# Patient Record
Sex: FEMALE | Race: WHITE | NOT HISPANIC OR LATINO | Employment: OTHER | URBAN - METROPOLITAN AREA
[De-identification: names, ages, dates, MRNs, and addresses within clinical notes are randomized per-mention and may not be internally consistent; named-entity substitution may affect disease eponyms.]

---

## 2023-04-18 DIAGNOSIS — R42 VERTIGO: Primary | ICD-10-CM

## 2023-04-18 DIAGNOSIS — F32.A DEPRESSION, UNSPECIFIED DEPRESSION TYPE: ICD-10-CM

## 2023-04-18 RX ORDER — MECLIZINE HYDROCHLORIDE 25 MG/1
25 TABLET ORAL NIGHTLY PRN
Qty: 30 TABLET | Refills: 2 | Status: SHIPPED | OUTPATIENT
Start: 2023-04-18 | End: 2024-03-15 | Stop reason: WASHOUT

## 2023-04-18 RX ORDER — MELOXICAM 15 MG/1
1 TABLET ORAL DAILY PRN
COMMUNITY
Start: 2019-11-12 | End: 2024-01-09 | Stop reason: SDUPTHER

## 2023-04-18 RX ORDER — CYCLOBENZAPRINE HCL 10 MG
1 TABLET ORAL 3 TIMES DAILY PRN
COMMUNITY
Start: 2021-11-17

## 2023-04-18 RX ORDER — SERTRALINE HYDROCHLORIDE 50 MG/1
1 TABLET, FILM COATED ORAL DAILY
COMMUNITY
Start: 2022-04-25 | End: 2024-03-15

## 2023-04-18 RX ORDER — LOSARTAN POTASSIUM 50 MG/1
1 TABLET ORAL DAILY
COMMUNITY
Start: 2022-04-14 | End: 2023-07-05 | Stop reason: SDUPTHER

## 2023-04-18 RX ORDER — PSYLLIUM HUSK 0.4 G
CAPSULE ORAL
COMMUNITY

## 2023-04-18 RX ORDER — SERTRALINE HYDROCHLORIDE 100 MG/1
100 TABLET, FILM COATED ORAL DAILY
Qty: 90 TABLET | Refills: 3 | Status: SHIPPED | OUTPATIENT
Start: 2023-04-18 | End: 2024-04-09 | Stop reason: SDUPTHER

## 2023-04-18 RX ORDER — SERTRALINE HYDROCHLORIDE 100 MG/1
1 TABLET, FILM COATED ORAL DAILY
COMMUNITY
Start: 2016-07-01 | End: 2023-04-18 | Stop reason: SDUPTHER

## 2023-04-18 RX ORDER — ALPRAZOLAM 0.5 MG/1
1 TABLET ORAL DAILY PRN
COMMUNITY
Start: 2017-08-09 | End: 2024-03-15 | Stop reason: SDUPTHER

## 2023-04-18 RX ORDER — LEVOTHYROXINE SODIUM 125 UG/1
1 TABLET ORAL DAILY
COMMUNITY
Start: 2016-04-07 | End: 2023-05-23 | Stop reason: SDUPTHER

## 2023-04-18 RX ORDER — PROPRANOLOL/HYDROCHLOROTHIAZID 40 MG-25MG
TABLET ORAL
COMMUNITY

## 2023-04-18 RX ORDER — ACETAMINOPHEN 500 MG
2 TABLET ORAL DAILY
COMMUNITY

## 2023-04-18 RX ORDER — MECLIZINE HYDROCHLORIDE 25 MG/1
1 TABLET ORAL NIGHTLY PRN
COMMUNITY
Start: 2017-08-09 | End: 2023-04-18 | Stop reason: SDUPTHER

## 2023-05-23 DIAGNOSIS — E03.9 HYPOTHYROIDISM, UNSPECIFIED TYPE: Primary | ICD-10-CM

## 2023-05-23 RX ORDER — LEVOTHYROXINE SODIUM 125 UG/1
125 TABLET ORAL DAILY
Qty: 90 TABLET | Refills: 3 | Status: SHIPPED | OUTPATIENT
Start: 2023-05-23

## 2023-07-05 DIAGNOSIS — I10 BENIGN ESSENTIAL HYPERTENSION: Primary | ICD-10-CM

## 2023-07-05 RX ORDER — LOSARTAN POTASSIUM 50 MG/1
50 TABLET ORAL DAILY
Qty: 90 TABLET | Refills: 1 | Status: SHIPPED | OUTPATIENT
Start: 2023-07-05 | End: 2023-07-06 | Stop reason: SDUPTHER

## 2023-07-06 DIAGNOSIS — I10 BENIGN ESSENTIAL HYPERTENSION: ICD-10-CM

## 2023-07-06 RX ORDER — LOSARTAN POTASSIUM 50 MG/1
50 TABLET ORAL DAILY
Qty: 90 TABLET | Refills: 3 | Status: SHIPPED | OUTPATIENT
Start: 2023-07-06

## 2023-08-24 ENCOUNTER — TELEPHONE (OUTPATIENT)
Dept: PRIMARY CARE | Facility: CLINIC | Age: 63
End: 2023-08-24
Payer: COMMERCIAL

## 2023-08-24 NOTE — TELEPHONE ENCOUNTER
Pt called and states that she has been treated for 2 UTI so far this year and is wondering if you could prescribe her Bactrim due to having another one. Pt symptoms are fever, urgency to urinate, cloudy,and a slight odor. Pharmacy is on file and no allergies to any medications

## 2023-12-07 ENCOUNTER — TELEPHONE (OUTPATIENT)
Dept: PRIMARY CARE | Facility: CLINIC | Age: 63
End: 2023-12-07
Payer: COMMERCIAL

## 2023-12-07 ENCOUNTER — LAB (OUTPATIENT)
Dept: LAB | Facility: LAB | Age: 63
End: 2023-12-07
Payer: COMMERCIAL

## 2023-12-07 DIAGNOSIS — N39.0 URINARY TRACT INFECTION WITHOUT HEMATURIA, SITE UNSPECIFIED: Primary | ICD-10-CM

## 2023-12-07 DIAGNOSIS — N39.0 URINARY TRACT INFECTION WITHOUT HEMATURIA, SITE UNSPECIFIED: ICD-10-CM

## 2023-12-07 LAB
BACTERIA #/AREA URNS AUTO: ABNORMAL /HPF
HYALINE CASTS #/AREA URNS AUTO: ABNORMAL /LPF
MUCOUS THREADS #/AREA URNS AUTO: ABNORMAL /LPF
RBC #/AREA URNS AUTO: >20 /HPF
WBC #/AREA URNS AUTO: >50 /HPF

## 2023-12-07 PROCEDURE — 81001 URINALYSIS AUTO W/SCOPE: CPT

## 2023-12-07 PROCEDURE — 87186 SC STD MICRODIL/AGAR DIL: CPT

## 2023-12-07 PROCEDURE — 87086 URINE CULTURE/COLONY COUNT: CPT

## 2023-12-07 NOTE — TELEPHONE ENCOUNTER
Pt called and states that she is still having the urgency to use the restroom. Pt states that you have been treating her for UTI and thinks that they are chronic now due to having urgency's and the color of her urine. Pt would like to know if she could stop in the office and give a urine sample.

## 2023-12-08 DIAGNOSIS — N39.0 URINARY TRACT INFECTION WITHOUT HEMATURIA, SITE UNSPECIFIED: Primary | ICD-10-CM

## 2023-12-08 RX ORDER — CIPROFLOXACIN 500 MG/1
500 TABLET ORAL 2 TIMES DAILY
Qty: 20 TABLET | Refills: 0 | Status: SHIPPED | OUTPATIENT
Start: 2023-12-08 | End: 2023-12-18

## 2023-12-10 LAB — BACTERIA UR CULT: ABNORMAL

## 2024-01-09 DIAGNOSIS — M19.91 PRIMARY OSTEOARTHRITIS, UNSPECIFIED SITE: Primary | ICD-10-CM

## 2024-01-09 RX ORDER — MELOXICAM 15 MG/1
15 TABLET ORAL DAILY PRN
Qty: 90 TABLET | Refills: 3 | Status: SHIPPED | OUTPATIENT
Start: 2024-01-09

## 2024-01-30 ENCOUNTER — APPOINTMENT (OUTPATIENT)
Dept: PRIMARY CARE | Facility: CLINIC | Age: 64
End: 2024-01-30
Payer: COMMERCIAL

## 2024-03-14 ASSESSMENT — PROMIS GLOBAL HEALTH SCALE
RATE_PHYSICAL_HEALTH: GOOD
RATE_GENERAL_HEALTH: GOOD
RATE_QUALITY_OF_LIFE: GOOD
CARRYOUT_PHYSICAL_ACTIVITIES: COMPLETELY
EMOTIONAL_PROBLEMS: RARELY
RATE_MENTAL_HEALTH: GOOD
CARRYOUT_SOCIAL_ACTIVITIES: VERY GOOD
RATE_AVERAGE_FATIGUE: MILD
RATE_AVERAGE_PAIN: 2
RATE_SOCIAL_SATISFACTION: GOOD

## 2024-03-15 ENCOUNTER — OFFICE VISIT (OUTPATIENT)
Dept: PRIMARY CARE | Facility: CLINIC | Age: 64
End: 2024-03-15
Payer: COMMERCIAL

## 2024-03-15 VITALS
HEIGHT: 62 IN | DIASTOLIC BLOOD PRESSURE: 76 MMHG | TEMPERATURE: 97.7 F | BODY MASS INDEX: 36.95 KG/M2 | WEIGHT: 200.8 LBS | OXYGEN SATURATION: 99 % | HEART RATE: 88 BPM | SYSTOLIC BLOOD PRESSURE: 120 MMHG

## 2024-03-15 DIAGNOSIS — I10 BENIGN ESSENTIAL HYPERTENSION: ICD-10-CM

## 2024-03-15 DIAGNOSIS — Z12.31 SCREENING MAMMOGRAM FOR BREAST CANCER: ICD-10-CM

## 2024-03-15 DIAGNOSIS — Z00.00 WELL ADULT EXAM: Primary | ICD-10-CM

## 2024-03-15 DIAGNOSIS — F41.9 ANXIETY: ICD-10-CM

## 2024-03-15 DIAGNOSIS — M15.9 GENERALIZED OSTEOARTHRITIS OF MULTIPLE SITES: ICD-10-CM

## 2024-03-15 DIAGNOSIS — E03.9 HYPOTHYROIDISM, UNSPECIFIED TYPE: ICD-10-CM

## 2024-03-15 PROBLEM — L30.9 ECZEMA: Status: ACTIVE | Noted: 2024-03-15

## 2024-03-15 PROBLEM — J02.9 ACUTE PHARYNGITIS: Status: ACTIVE | Noted: 2024-03-15

## 2024-03-15 PROBLEM — M85.80 OSTEOPENIA: Status: ACTIVE | Noted: 2024-03-15

## 2024-03-15 PROBLEM — S83.002A: Status: ACTIVE | Noted: 2024-03-15

## 2024-03-15 PROBLEM — M25.561 KNEE PAIN, RIGHT: Status: ACTIVE | Noted: 2024-03-15

## 2024-03-15 PROBLEM — H81.10 BENIGN POSITIONAL VERTIGO: Status: ACTIVE | Noted: 2024-03-15

## 2024-03-15 PROBLEM — F41.8 OTHER SPECIFIED ANXIETY DISORDERS: Status: ACTIVE | Noted: 2024-03-15

## 2024-03-15 PROBLEM — G47.33 OSA (OBSTRUCTIVE SLEEP APNEA): Status: ACTIVE | Noted: 2024-03-15

## 2024-03-15 PROBLEM — M79.671 PAIN IN RIGHT FOOT: Status: ACTIVE | Noted: 2017-01-12

## 2024-03-15 PROBLEM — N39.0 ACUTE UTI: Status: ACTIVE | Noted: 2024-03-15

## 2024-03-15 PROBLEM — M25.572 PAIN IN JOINT, FOOT, LEFT: Status: ACTIVE | Noted: 2017-01-12

## 2024-03-15 PROBLEM — R50.9 CHILLS WITH FEVER: Status: ACTIVE | Noted: 2024-03-15

## 2024-03-15 PROBLEM — M25.571 PAIN IN JOINT OF RIGHT FOOT: Status: ACTIVE | Noted: 2017-01-12

## 2024-03-15 PROBLEM — R92.8 ABNORMAL MAMMOGRAM: Status: ACTIVE | Noted: 2024-03-15

## 2024-03-15 PROBLEM — M17.11 PATELLOFEMORAL ARTHRITIS OF RIGHT KNEE: Status: ACTIVE | Noted: 2024-03-15

## 2024-03-15 PROBLEM — S39.012A LUMBAR STRAIN: Status: ACTIVE | Noted: 2024-03-15

## 2024-03-15 LAB
25(OH)D3 SERPL-MCNC: 76 NG/ML (ref 30–100)
ALBUMIN SERPL BCP-MCNC: 4.6 G/DL (ref 3.4–5)
ALP SERPL-CCNC: 70 U/L (ref 33–136)
ALT SERPL W P-5'-P-CCNC: 25 U/L (ref 7–45)
ANION GAP SERPL CALC-SCNC: 15 MMOL/L (ref 10–20)
AST SERPL W P-5'-P-CCNC: 22 U/L (ref 9–39)
BASOPHILS # BLD AUTO: 0.04 X10*3/UL (ref 0–0.1)
BASOPHILS NFR BLD AUTO: 0.6 %
BILIRUB SERPL-MCNC: 0.6 MG/DL (ref 0–1.2)
BUN SERPL-MCNC: 15 MG/DL (ref 6–23)
CALCIUM SERPL-MCNC: 10.1 MG/DL (ref 8.6–10.6)
CHLORIDE SERPL-SCNC: 103 MMOL/L (ref 98–107)
CHOLEST SERPL-MCNC: 248 MG/DL (ref 0–199)
CHOLESTEROL/HDL RATIO: 5.2
CO2 SERPL-SCNC: 25 MMOL/L (ref 21–32)
CREAT SERPL-MCNC: 0.68 MG/DL (ref 0.5–1.05)
EGFRCR SERPLBLD CKD-EPI 2021: >90 ML/MIN/1.73M*2
EOSINOPHIL # BLD AUTO: 0.2 X10*3/UL (ref 0–0.7)
EOSINOPHIL NFR BLD AUTO: 3.2 %
ERYTHROCYTE [DISTWIDTH] IN BLOOD BY AUTOMATED COUNT: 13.2 % (ref 11.5–14.5)
GLUCOSE SERPL-MCNC: 99 MG/DL (ref 74–99)
HCT VFR BLD AUTO: 46.8 % (ref 36–46)
HDLC SERPL-MCNC: 47.5 MG/DL
HGB BLD-MCNC: 14.8 G/DL (ref 12–16)
IMM GRANULOCYTES # BLD AUTO: 0.02 X10*3/UL (ref 0–0.7)
IMM GRANULOCYTES NFR BLD AUTO: 0.3 % (ref 0–0.9)
LDLC SERPL CALC-MCNC: 169 MG/DL
LYMPHOCYTES # BLD AUTO: 1.02 X10*3/UL (ref 1.2–4.8)
LYMPHOCYTES NFR BLD AUTO: 16.6 %
MCH RBC QN AUTO: 28.2 PG (ref 26–34)
MCHC RBC AUTO-ENTMCNC: 31.6 G/DL (ref 32–36)
MCV RBC AUTO: 89 FL (ref 80–100)
MONOCYTES # BLD AUTO: 0.34 X10*3/UL (ref 0.1–1)
MONOCYTES NFR BLD AUTO: 5.5 %
NEUTROPHILS # BLD AUTO: 4.54 X10*3/UL (ref 1.2–7.7)
NEUTROPHILS NFR BLD AUTO: 73.8 %
NON HDL CHOLESTEROL: 201 MG/DL (ref 0–149)
NRBC BLD-RTO: 0 /100 WBCS (ref 0–0)
PLATELET # BLD AUTO: 200 X10*3/UL (ref 150–450)
POTASSIUM SERPL-SCNC: 4.4 MMOL/L (ref 3.5–5.3)
PROT SERPL-MCNC: 7.2 G/DL (ref 6.4–8.2)
RBC # BLD AUTO: 5.25 X10*6/UL (ref 4–5.2)
SODIUM SERPL-SCNC: 139 MMOL/L (ref 136–145)
TRIGL SERPL-MCNC: 157 MG/DL (ref 0–149)
TSH SERPL-ACNC: 3.53 MIU/L (ref 0.44–3.98)
VIT B12 SERPL-MCNC: 564 PG/ML (ref 211–911)
VLDL: 31 MG/DL (ref 0–40)
WBC # BLD AUTO: 6.2 X10*3/UL (ref 4.4–11.3)

## 2024-03-15 PROCEDURE — 1036F TOBACCO NON-USER: CPT | Performed by: FAMILY MEDICINE

## 2024-03-15 PROCEDURE — 3078F DIAST BP <80 MM HG: CPT | Performed by: FAMILY MEDICINE

## 2024-03-15 PROCEDURE — 36415 COLL VENOUS BLD VENIPUNCTURE: CPT

## 2024-03-15 PROCEDURE — 80061 LIPID PANEL: CPT

## 2024-03-15 PROCEDURE — 80053 COMPREHEN METABOLIC PANEL: CPT

## 2024-03-15 PROCEDURE — 99396 PREV VISIT EST AGE 40-64: CPT | Performed by: FAMILY MEDICINE

## 2024-03-15 PROCEDURE — 84443 ASSAY THYROID STIM HORMONE: CPT

## 2024-03-15 PROCEDURE — 3074F SYST BP LT 130 MM HG: CPT | Performed by: FAMILY MEDICINE

## 2024-03-15 PROCEDURE — 82607 VITAMIN B-12: CPT

## 2024-03-15 PROCEDURE — 3008F BODY MASS INDEX DOCD: CPT | Performed by: FAMILY MEDICINE

## 2024-03-15 PROCEDURE — 85025 COMPLETE CBC W/AUTO DIFF WBC: CPT

## 2024-03-15 PROCEDURE — 82306 VITAMIN D 25 HYDROXY: CPT

## 2024-03-15 RX ORDER — ALPRAZOLAM 0.5 MG/1
0.5 TABLET ORAL NIGHTLY PRN
Qty: 10 TABLET | Refills: 0 | Status: SHIPPED | OUTPATIENT
Start: 2024-03-15 | End: 2024-03-25

## 2024-03-15 RX ORDER — MINOCYCLINE HYDROCHLORIDE 1 MG/1
POWDER ORAL
COMMUNITY
Start: 2016-04-18 | End: 2024-03-15 | Stop reason: WASHOUT

## 2024-03-15 RX ORDER — TRAZODONE HYDROCHLORIDE 50 MG/1
TABLET ORAL
COMMUNITY
Start: 2016-01-04 | End: 2024-03-15 | Stop reason: WASHOUT

## 2024-03-15 RX ORDER — LIDOCAINE AND PRILOCAINE 25; 25 MG/G; MG/G
CREAM TOPICAL
COMMUNITY
Start: 2016-04-07 | End: 2024-03-15 | Stop reason: WASHOUT

## 2024-03-15 ASSESSMENT — ENCOUNTER SYMPTOMS
GASTROINTESTINAL NEGATIVE: 1
NEUROLOGICAL NEGATIVE: 1
ENDOCRINE NEGATIVE: 1
NERVOUS/ANXIOUS: 1
ARTHRALGIAS: 1
CARDIOVASCULAR NEGATIVE: 1
RESPIRATORY NEGATIVE: 1
ENDOCRINE COMMENTS: THYROID DISEASE
CONSTITUTIONAL NEGATIVE: 1

## 2024-03-15 ASSESSMENT — PAIN SCALES - GENERAL: PAINLEVEL: 0-NO PAIN

## 2024-03-15 NOTE — PROGRESS NOTES
"Subjective   Patient ID: Nancy Marcus is a 64 y.o. female who presents for Annual Exam (Annual cpe fasting bw).    HPI     Review of Systems   Constitutional: Negative.    HENT: Negative.     Respiratory: Negative.     Cardiovascular: Negative.    Gastrointestinal: Negative.    Endocrine: Negative.         Thyroid disease   Genitourinary: Negative.    Musculoskeletal:  Positive for arthralgias.   Neurological: Negative.    Psychiatric/Behavioral:  The patient is nervous/anxious.        Objective   /76 (BP Location: Left arm)   Pulse 88   Temp 36.5 °C (97.7 °F) (Temporal)   Ht 1.575 m (5' 2\")   Wt 91.1 kg (200 lb 12.8 oz)   SpO2 99%   BMI 36.73 kg/m²     Physical Exam  Vitals and nursing note reviewed.   Constitutional:       Appearance: Normal appearance.   HENT:      Right Ear: Tympanic membrane normal.      Left Ear: Tympanic membrane normal.      Ears:      Comments: Bilateral cerumen removed     Mouth/Throat:      Pharynx: Oropharynx is clear.   Cardiovascular:      Rate and Rhythm: Normal rate and regular rhythm.      Pulses: Normal pulses.      Heart sounds: Normal heart sounds.   Pulmonary:      Breath sounds: Normal breath sounds.   Abdominal:      Palpations: Abdomen is soft.   Musculoskeletal:         General: Normal range of motion.      Comments: DJD joints multiple   Neurological:      General: No focal deficit present.      Mental Status: She is alert and oriented to person, place, and time.   Psychiatric:         Mood and Affect: Mood normal.         Behavior: Behavior normal.         Assessment/Plan patient seen here for general physical.  We are drawing her lab work here today she has order for mammogram and a bone density.  Will see her back in a year  Problem List Items Addressed This Visit             ICD-10-CM    Benign essential hypertension I10    Generalized osteoarthritis of multiple sites M15.9    Hypothyroidism E03.9     Other Visit Diagnoses         Codes    Well adult exam  "   -  Primary Z00.00    Relevant Orders    Lipid Panel    CBC and Auto Differential    Comprehensive Metabolic Panel    TSH with reflex to Free T4 if abnormal    Vitamin B12    Vitamin D 25-Hydroxy,Total (for eval of Vitamin D levels)    XR DEXA bone density    Anxiety     F41.9    Relevant Medications    ALPRAZolam (Xanax) 0.5 mg tablet    BMI 36.0-36.9,adult     Z68.36    Screening mammogram for breast cancer     Z12.31    Relevant Orders    BI mammo bilateral screening tomosynthesis

## 2024-03-18 ENCOUNTER — TELEPHONE (OUTPATIENT)
Dept: PRIMARY CARE | Facility: CLINIC | Age: 64
End: 2024-03-18
Payer: COMMERCIAL

## 2024-03-18 DIAGNOSIS — E78.5 HYPERLIPIDEMIA, UNSPECIFIED HYPERLIPIDEMIA TYPE: Primary | ICD-10-CM

## 2024-03-18 RX ORDER — ROSUVASTATIN CALCIUM 10 MG/1
10 TABLET, COATED ORAL DAILY
Qty: 100 TABLET | Refills: 3 | Status: SHIPPED | OUTPATIENT
Start: 2024-03-18 | End: 2025-04-22

## 2024-04-09 DIAGNOSIS — F32.A DEPRESSION, UNSPECIFIED DEPRESSION TYPE: ICD-10-CM

## 2024-04-09 RX ORDER — SERTRALINE HYDROCHLORIDE 100 MG/1
100 TABLET, FILM COATED ORAL DAILY
Qty: 90 TABLET | Refills: 3 | Status: SHIPPED | OUTPATIENT
Start: 2024-04-09

## 2024-05-04 ENCOUNTER — HOSPITAL ENCOUNTER (OUTPATIENT)
Dept: RADIOLOGY | Facility: CLINIC | Age: 64
Discharge: HOME | End: 2024-05-04
Payer: COMMERCIAL

## 2024-05-04 VITALS — WEIGHT: 200 LBS | HEIGHT: 62 IN | BODY MASS INDEX: 36.8 KG/M2

## 2024-05-04 DIAGNOSIS — Z12.31 SCREENING MAMMOGRAM FOR BREAST CANCER: ICD-10-CM

## 2024-05-04 DIAGNOSIS — Z00.00 WELL ADULT EXAM: ICD-10-CM

## 2024-05-04 PROCEDURE — 77080 DXA BONE DENSITY AXIAL: CPT | Performed by: RADIOLOGY

## 2024-05-04 PROCEDURE — 77080 DXA BONE DENSITY AXIAL: CPT

## 2024-05-04 PROCEDURE — 77067 SCR MAMMO BI INCL CAD: CPT

## 2024-05-04 PROCEDURE — 77063 BREAST TOMOSYNTHESIS BI: CPT | Performed by: RADIOLOGY

## 2024-05-04 PROCEDURE — 77067 SCR MAMMO BI INCL CAD: CPT | Performed by: RADIOLOGY

## 2024-05-06 DIAGNOSIS — R92.8 ABNORMAL MAMMOGRAM OF RIGHT BREAST: Primary | ICD-10-CM

## 2024-05-08 ENCOUNTER — HOSPITAL ENCOUNTER (OUTPATIENT)
Dept: RADIOLOGY | Facility: CLINIC | Age: 64
Discharge: HOME | End: 2024-05-08
Payer: COMMERCIAL

## 2024-05-08 DIAGNOSIS — R92.8 OTHER ABNORMAL AND INCONCLUSIVE FINDINGS ON DIAGNOSTIC IMAGING OF BREAST: ICD-10-CM

## 2024-05-08 PROCEDURE — 76642 ULTRASOUND BREAST LIMITED: CPT | Mod: RIGHT SIDE | Performed by: RADIOLOGY

## 2024-05-08 PROCEDURE — 77061 BREAST TOMOSYNTHESIS UNI: CPT | Mod: RT

## 2024-05-08 PROCEDURE — 77065 DX MAMMO INCL CAD UNI: CPT | Mod: RIGHT SIDE | Performed by: RADIOLOGY

## 2024-05-08 PROCEDURE — 77061 BREAST TOMOSYNTHESIS UNI: CPT | Mod: RIGHT SIDE | Performed by: RADIOLOGY

## 2024-05-08 PROCEDURE — 76642 ULTRASOUND BREAST LIMITED: CPT | Mod: RT

## 2024-06-10 DIAGNOSIS — E03.9 HYPOTHYROIDISM, UNSPECIFIED TYPE: ICD-10-CM

## 2024-06-11 RX ORDER — LEVOTHYROXINE SODIUM 125 UG/1
125 TABLET ORAL DAILY
Qty: 90 TABLET | Refills: 3 | Status: SHIPPED | OUTPATIENT
Start: 2024-06-11

## 2024-07-11 DIAGNOSIS — I10 BENIGN ESSENTIAL HYPERTENSION: ICD-10-CM

## 2024-07-11 RX ORDER — LOSARTAN POTASSIUM 50 MG/1
50 TABLET ORAL DAILY
Qty: 90 TABLET | Refills: 3 | Status: SHIPPED | OUTPATIENT
Start: 2024-07-11

## 2024-07-11 NOTE — TELEPHONE ENCOUNTER
[facility] pharmacy sent over a fax requesting a refill for losartan 50mg.

## 2024-09-11 ENCOUNTER — TELEPHONE (OUTPATIENT)
Dept: PRIMARY CARE | Facility: CLINIC | Age: 64
End: 2024-09-11
Payer: COMMERCIAL

## 2024-09-12 DIAGNOSIS — R92.8 ABNORMAL MAMMOGRAM OF RIGHT BREAST: Primary | ICD-10-CM

## 2024-09-12 DIAGNOSIS — I10 BENIGN ESSENTIAL HYPERTENSION: ICD-10-CM

## 2024-09-27 ENCOUNTER — LAB (OUTPATIENT)
Dept: LAB | Facility: LAB | Age: 64
End: 2024-09-27
Payer: COMMERCIAL

## 2024-09-27 DIAGNOSIS — I10 BENIGN ESSENTIAL HYPERTENSION: ICD-10-CM

## 2024-09-27 LAB
ALBUMIN SERPL BCP-MCNC: 4.4 G/DL (ref 3.4–5)
ALP SERPL-CCNC: 71 U/L (ref 33–136)
ALT SERPL W P-5'-P-CCNC: 17 U/L (ref 7–45)
ANION GAP SERPL CALC-SCNC: 12 MMOL/L (ref 10–20)
AST SERPL W P-5'-P-CCNC: 19 U/L (ref 9–39)
BILIRUB SERPL-MCNC: 0.5 MG/DL (ref 0–1.2)
BUN SERPL-MCNC: 15 MG/DL (ref 6–23)
CALCIUM SERPL-MCNC: 9.6 MG/DL (ref 8.6–10.3)
CHLORIDE SERPL-SCNC: 104 MMOL/L (ref 98–107)
CHOLEST SERPL-MCNC: 139 MG/DL (ref 0–199)
CHOLESTEROL/HDL RATIO: 2.9
CO2 SERPL-SCNC: 29 MMOL/L (ref 21–32)
CREAT SERPL-MCNC: 0.73 MG/DL (ref 0.5–1.05)
EGFRCR SERPLBLD CKD-EPI 2021: >90 ML/MIN/1.73M*2
GLUCOSE SERPL-MCNC: 92 MG/DL (ref 74–99)
HDLC SERPL-MCNC: 47.7 MG/DL
LDLC SERPL CALC-MCNC: 59 MG/DL
NON HDL CHOLESTEROL: 91 MG/DL (ref 0–149)
POTASSIUM SERPL-SCNC: 4.6 MMOL/L (ref 3.5–5.3)
PROT SERPL-MCNC: 6.9 G/DL (ref 6.4–8.2)
SODIUM SERPL-SCNC: 140 MMOL/L (ref 136–145)
TRIGL SERPL-MCNC: 162 MG/DL (ref 0–149)
VLDL: 32 MG/DL (ref 0–40)

## 2024-09-27 PROCEDURE — 80053 COMPREHEN METABOLIC PANEL: CPT

## 2024-09-27 PROCEDURE — 36415 COLL VENOUS BLD VENIPUNCTURE: CPT

## 2024-09-27 PROCEDURE — 80061 LIPID PANEL: CPT

## 2024-10-21 ENCOUNTER — OFFICE VISIT (OUTPATIENT)
Dept: ORTHOPEDIC SURGERY | Facility: CLINIC | Age: 64
End: 2024-10-21
Payer: COMMERCIAL

## 2024-10-21 ENCOUNTER — HOSPITAL ENCOUNTER (OUTPATIENT)
Dept: RADIOLOGY | Facility: HOSPITAL | Age: 64
Discharge: HOME | End: 2024-10-21
Payer: COMMERCIAL

## 2024-10-21 VITALS — BODY MASS INDEX: 35.88 KG/M2 | WEIGHT: 195 LBS | HEIGHT: 62 IN

## 2024-10-21 DIAGNOSIS — M25.512 ACUTE PAIN OF LEFT SHOULDER: ICD-10-CM

## 2024-10-21 DIAGNOSIS — S46.012A TRAUMATIC ROTATOR CUFF TEAR, LEFT, INITIAL ENCOUNTER: Primary | ICD-10-CM

## 2024-10-21 DIAGNOSIS — M25.512 PAIN IN LEFT SHOULDER: ICD-10-CM

## 2024-10-21 PROCEDURE — 1036F TOBACCO NON-USER: CPT | Performed by: ORTHOPAEDIC SURGERY

## 2024-10-21 PROCEDURE — 99214 OFFICE O/P EST MOD 30 MIN: CPT | Performed by: ORTHOPAEDIC SURGERY

## 2024-10-21 PROCEDURE — 3008F BODY MASS INDEX DOCD: CPT | Performed by: ORTHOPAEDIC SURGERY

## 2024-10-21 PROCEDURE — 73030 X-RAY EXAM OF SHOULDER: CPT | Mod: LT

## 2024-10-21 PROCEDURE — 73030 X-RAY EXAM OF SHOULDER: CPT | Mod: LEFT SIDE | Performed by: RADIOLOGY

## 2024-10-21 NOTE — PROGRESS NOTES
Subjective    Patient ID: Nancy Marcus is a 64 y.o. female.    Chief Complaint: Pain of the Left Shoulder       This is a 64-year-old female who is right-hand dominant presents for evaluation of left shoulder pain ongoing for the past 1 month directly related to an injury when she fell jamming her shoulder.  Since that time she has noted a sensation of weakness and loss of motion.  She has tried rest, modifications of activities, Tylenol, meloxicam as well as ice with limited benefit.  Denies any previous history of left shoulder injury.    This patient's past medical, social, and family history were reviewed as well as a review of systems including updates on the patient's information encounter sheet  Denies any history of diabetes  Patient does not smoke    Physical Examination  Constitutional: Patient's height and weight reviewed, appears well kempt  Psychiatric: Alert and oriented ×3, appropriate mood and behavior  Pulmonary: Breathing appears nonlabored, no apparent distress  Lymphatic: No appreciable lymphadenopathy to both the upper and lower extremities  Skin: No open lesions, rashes, ulcerations  Neurologic: Gross motor and sensory exam appear intact (except for abnormalities noted in the below muscle skeletal exam)    Musculoskeletal: The left glenohumeral joint is grossly well reduced.  There is a painful arc of motion consistent with impingement syndrome.  There is reasonable passive range of motion but active range of motion is limited by pain.  External rotation strength is limited at 3+/5.  Abduction strength also limited by pain at 3+/5.  Internal rotation strength remains 5/5.    X-rays performed today and reviewed at length by myself along with the patient demonstrate the left glenohumeral joint is well reduced.  There is no evidence of a fracture.  No significant arthritic changes other than some mild arthritic changes at the AC joint    Assessment: Traumatic tear of rotator cuff tear left  shoulder    Plan: An extended discussion ensued with the patient regarding treatment options.  Due to her age and weakness I think she would be a reasonable candidate for surgical intervention if indeed there is a full-thickness rotator cuff tear.  Based on this I have suggested an MRI scan of the left shoulder and likely referral to Dr. Davis in consultation following the MRI scan.        Left Shoulder           Current Outpatient Medications:     cholecalciferol (Vitamin D-3) 50 mcg (2,000 unit) capsule, Take 2 tablets by mouth once daily., Disp: , Rfl:     levothyroxine (Synthroid, Levoxyl) 125 mcg tablet, Take 1 tablet (125 mcg) by mouth once daily., Disp: 90 tablet, Rfl: 3    losartan (Cozaar) 50 mg tablet, Take 1 tablet (50 mg) by mouth once daily., Disp: 90 tablet, Rfl: 3    meloxicam (Mobic) 15 mg tablet, Take 1 tablet (15 mg) by mouth once daily as needed for mild pain (1 - 3)., Disp: 90 tablet, Rfl: 3    rosuvastatin (Crestor) 10 mg tablet, Take 1 tablet (10 mg) by mouth once daily., Disp: 100 tablet, Rfl: 3    sertraline (Zoloft) 100 mg tablet, Take 1 tablet (100 mg) by mouth once daily., Disp: 90 tablet, Rfl: 3    ALPRAZolam (Xanax) 0.5 mg tablet, Take 1 tablet (0.5 mg) by mouth as needed at bedtime for sleep for up to 10 days., Disp: 10 tablet, Rfl: 0    cetirizine (ZYRTEC) 10 mg capsule, Take by mouth., Disp: , Rfl:     cyclobenzaprine (Flexeril) 10 mg tablet, Take 1 tablet (10 mg) by mouth 3 times a day as needed., Disp: , Rfl:     psyllium (MetamuciL) 0.4 gram capsule, Take by mouth., Disp: , Rfl:     turmeric-turmeric root extract 450-50 mg capsule, Take by mouth., Disp: , Rfl:

## 2024-11-08 ENCOUNTER — HOSPITAL ENCOUNTER (OUTPATIENT)
Dept: RADIOLOGY | Facility: CLINIC | Age: 64
Discharge: HOME | End: 2024-11-08
Payer: COMMERCIAL

## 2024-11-08 DIAGNOSIS — M25.512 ACUTE PAIN OF LEFT SHOULDER: ICD-10-CM

## 2024-11-08 DIAGNOSIS — S46.012A TRAUMATIC ROTATOR CUFF TEAR, LEFT, INITIAL ENCOUNTER: ICD-10-CM

## 2024-11-08 PROCEDURE — 73221 MRI JOINT UPR EXTREM W/O DYE: CPT | Mod: LT

## 2024-11-19 ENCOUNTER — OFFICE VISIT (OUTPATIENT)
Dept: ORTHOPEDIC SURGERY | Facility: CLINIC | Age: 64
End: 2024-11-19
Payer: COMMERCIAL

## 2024-11-19 VITALS — HEIGHT: 62 IN | WEIGHT: 195 LBS | BODY MASS INDEX: 35.88 KG/M2

## 2024-11-19 DIAGNOSIS — S46.012A ROTATOR CUFF STRAIN, LEFT, INITIAL ENCOUNTER: Primary | ICD-10-CM

## 2024-11-19 PROCEDURE — 3008F BODY MASS INDEX DOCD: CPT | Performed by: ORTHOPAEDIC SURGERY

## 2024-11-19 PROCEDURE — 99214 OFFICE O/P EST MOD 30 MIN: CPT | Performed by: ORTHOPAEDIC SURGERY

## 2024-11-19 PROCEDURE — 1036F TOBACCO NON-USER: CPT | Performed by: ORTHOPAEDIC SURGERY

## 2024-11-19 RX ORDER — CEFAZOLIN SODIUM 2 G/100ML
2 INJECTION, SOLUTION INTRAVENOUS ONCE
OUTPATIENT
Start: 2024-11-19 | End: 2024-11-19

## 2024-11-19 NOTE — PROGRESS NOTES
64-year-old is seen for her left shoulder.  She has been having persistent moderate throbbing pain in the left shoulder.  She has difficulty with overhead reaching and lifting activities.  She fell and injured the shoulder September 22, 2024.  She has been having difficulty since that time.  She has rested and avoid aggravating activities.  She is taken Tylenol and meloxicam.  Denies shoulder problems prior to this.    Pleasant and no acute distress.  Left shoulder forward flexion 140°. No effusion or instability. There is positive Neer and Robbins impingement. There is subacromial crepitus and tenderness around the subacromial space.  There is biceps tenderness. There is a positive Smithfield's test. No acromioclavicular tenderness. Rotator cuff strength is decreased and there is discomfort with strength testing. Right shoulder forward flexion 180°. No effusion or instability. No impingement or crepitus or tenderness involving the subacromial space. No biceps or acromioclavicular tenderness. There is intact rotator cuff strength. There is adequate range of motion of the cervical spine without pain. Both upper extremities are well perfused skin is intact and muscle tone is adequate. Elbow flexion and extension and wrist flexion and extension strength are intact.    Multiple x-ray views of the left shoulder are personally reviewed and there is no acute bony abnormality.    MRI of the left shoulder was personally reviewed and there is full-thickness tear involving the supraspinatus with extension into the infraspinatus.  There is retraction.  There is intramuscular edema and atrophy involving the supraspinatus and infraspinatus.  There is thickening involving the bicep tendon.  There is tearing involving the labrum.    A detailed discussion about the rotator cuff tear was done.  Treatment options including no treatment were reviewed.  Realistic expectations were discussed.  Discussion was done about the rotator cuff tear  and the retraction and muscle atrophy.  Treatment options including no treatment reviewed and the decision was made to proceed with left shoulder arthroscopy with rotator cuff repair and extensive debridement and subacromial decompression.  The potential for the tear to be irreparable was discussed.  Potential for limiting healing potential even if the tear can be repaired was also discussed.  Limitations of arthroscopy were reviewed.  Risks including but not limited to infection thromboembolus neurovascular tree medical problems stiffness were reviewed and she understands this and has elected to proceed.

## 2024-11-21 PROBLEM — S46.012A ROTATOR CUFF STRAIN, LEFT, INITIAL ENCOUNTER: Status: ACTIVE | Noted: 2024-11-19

## 2024-11-26 ENCOUNTER — HOSPITAL ENCOUNTER (OUTPATIENT)
Dept: RADIOLOGY | Facility: HOSPITAL | Age: 64
Discharge: HOME | End: 2024-11-26
Payer: COMMERCIAL

## 2024-11-26 DIAGNOSIS — R92.8 ABNORMAL MAMMOGRAM OF RIGHT BREAST: ICD-10-CM

## 2024-11-26 PROCEDURE — 77061 BREAST TOMOSYNTHESIS UNI: CPT | Mod: RIGHT SIDE | Performed by: RADIOLOGY

## 2024-11-26 PROCEDURE — 77065 DX MAMMO INCL CAD UNI: CPT | Mod: RIGHT SIDE | Performed by: RADIOLOGY

## 2024-11-26 PROCEDURE — 77061 BREAST TOMOSYNTHESIS UNI: CPT | Mod: RT

## 2024-12-05 ENCOUNTER — APPOINTMENT (OUTPATIENT)
Dept: RADIOLOGY | Facility: CLINIC | Age: 64
End: 2024-12-05
Payer: COMMERCIAL

## 2025-01-09 ENCOUNTER — TELEPHONE (OUTPATIENT)
Dept: PRIMARY CARE | Facility: CLINIC | Age: 65
End: 2025-01-09

## 2025-01-09 ENCOUNTER — PRE-ADMISSION TESTING (OUTPATIENT)
Dept: PREADMISSION TESTING | Facility: HOSPITAL | Age: 65
End: 2025-01-09
Payer: COMMERCIAL

## 2025-01-09 ENCOUNTER — APPOINTMENT (OUTPATIENT)
Dept: ORTHOPEDIC SURGERY | Facility: CLINIC | Age: 65
End: 2025-01-09
Payer: COMMERCIAL

## 2025-01-09 VITALS
OXYGEN SATURATION: 100 % | HEIGHT: 62 IN | HEART RATE: 89 BPM | BODY MASS INDEX: 36.92 KG/M2 | TEMPERATURE: 95.5 F | WEIGHT: 200.62 LBS | RESPIRATION RATE: 18 BRPM

## 2025-01-09 DIAGNOSIS — Z01.818 PRE-OP TESTING: Primary | ICD-10-CM

## 2025-01-09 DIAGNOSIS — M15.9 GENERALIZED OSTEOARTHRITIS OF MULTIPLE SITES: Primary | ICD-10-CM

## 2025-01-09 LAB
ANION GAP SERPL CALC-SCNC: 14 MMOL/L (ref 10–20)
BUN SERPL-MCNC: 16 MG/DL (ref 6–23)
CALCIUM SERPL-MCNC: 10.1 MG/DL (ref 8.6–10.3)
CHLORIDE SERPL-SCNC: 104 MMOL/L (ref 98–107)
CO2 SERPL-SCNC: 27 MMOL/L (ref 21–32)
CREAT SERPL-MCNC: 0.73 MG/DL (ref 0.5–1.05)
EGFRCR SERPLBLD CKD-EPI 2021: >90 ML/MIN/1.73M*2
ERYTHROCYTE [DISTWIDTH] IN BLOOD BY AUTOMATED COUNT: 12.4 % (ref 11.5–14.5)
GLUCOSE SERPL-MCNC: 89 MG/DL (ref 74–99)
HCT VFR BLD AUTO: 44.5 % (ref 36–46)
HGB BLD-MCNC: 14.9 G/DL (ref 12–16)
MCH RBC QN AUTO: 29.9 PG (ref 26–34)
MCHC RBC AUTO-ENTMCNC: 33.5 G/DL (ref 32–36)
MCV RBC AUTO: 89 FL (ref 80–100)
NRBC BLD-RTO: 0 /100 WBCS (ref 0–0)
PLATELET # BLD AUTO: 196 X10*3/UL (ref 150–450)
POTASSIUM SERPL-SCNC: 4.3 MMOL/L (ref 3.5–5.3)
RBC # BLD AUTO: 4.99 X10*6/UL (ref 4–5.2)
SODIUM SERPL-SCNC: 141 MMOL/L (ref 136–145)
T4 FREE SERPL-MCNC: 0.81 NG/DL (ref 0.61–1.12)
TSH SERPL-ACNC: 4.05 MIU/L (ref 0.44–3.98)
WBC # BLD AUTO: 7.8 X10*3/UL (ref 4.4–11.3)

## 2025-01-09 PROCEDURE — 84443 ASSAY THYROID STIM HORMONE: CPT

## 2025-01-09 PROCEDURE — 93005 ELECTROCARDIOGRAM TRACING: CPT

## 2025-01-09 PROCEDURE — 36415 COLL VENOUS BLD VENIPUNCTURE: CPT

## 2025-01-09 PROCEDURE — 84439 ASSAY OF FREE THYROXINE: CPT

## 2025-01-09 PROCEDURE — 87081 CULTURE SCREEN ONLY: CPT | Mod: PARLAB

## 2025-01-09 PROCEDURE — 80048 BASIC METABOLIC PNL TOTAL CA: CPT

## 2025-01-09 PROCEDURE — 85027 COMPLETE CBC AUTOMATED: CPT

## 2025-01-09 PROCEDURE — 99204 OFFICE O/P NEW MOD 45 MIN: CPT | Performed by: NURSE PRACTITIONER

## 2025-01-09 RX ORDER — CHLORHEXIDINE GLUCONATE 40 MG/ML
SOLUTION TOPICAL DAILY
Qty: 118 ML | Refills: 0 | Status: SHIPPED | OUTPATIENT
Start: 2025-01-09 | End: 2025-01-14

## 2025-01-09 RX ORDER — CYCLOBENZAPRINE HCL 10 MG
10 TABLET ORAL 3 TIMES DAILY PRN
Qty: 30 TABLET | Refills: 1 | Status: SHIPPED | OUTPATIENT
Start: 2025-01-09

## 2025-01-09 RX ORDER — EVE PRIMROSE/LINOLEIC/G-LENIC 1000 MG
1000 CAPSULE ORAL DAILY
COMMUNITY

## 2025-01-09 RX ORDER — CHLORHEXIDINE GLUCONATE ORAL RINSE 1.2 MG/ML
15 SOLUTION DENTAL AS NEEDED
Qty: 120 ML | Refills: 0 | Status: SHIPPED | OUTPATIENT
Start: 2025-01-09

## 2025-01-09 RX ORDER — BISMUTH SUBSALICYLATE 262 MG
1 TABLET,CHEWABLE ORAL DAILY
COMMUNITY

## 2025-01-09 ASSESSMENT — PAIN DESCRIPTION - DESCRIPTORS: DESCRIPTORS: THROBBING

## 2025-01-09 ASSESSMENT — DUKE ACTIVITY SCORE INDEX (DASI)
CAN YOU PARTICIPATE IN STRENOUS SPORTS LIKE SWIMMING, SINGLES TENNIS, FOOTBALL, BASKETBALL, OR SKIING: NO
CAN YOU DO YARD WORK LIKE RAKING LEAVES, WEEDING OR PUSHING A MOWER: YES
CAN YOU HAVE SEXUAL RELATIONS: YES
CAN YOU WALK INDOORS, SUCH AS AROUND YOUR HOUSE: YES
CAN YOU PARTICIPATE IN MODERATE RECREATIONAL ACTIVITIES LIKE GOLF, BOWLING, DANCING, DOUBLES TENNIS OR THROWING A BASEBALL OR FOOTBALL: NO
DASI METS SCORE: 7.3
CAN YOU DO MODERATE WORK AROUND THE HOUSE LIKE VACUUMING, SWEEPING FLOORS OR CARRYING GROCERIES: YES
CAN YOU RUN A SHORT DISTANCE: YES
CAN YOU TAKE CARE OF YOURSELF (EAT, DRESS, BATHE, OR USE TOILET): YES
CAN YOU DO LIGHT WORK AROUND THE HOUSE LIKE DUSTING OR WASHING DISHES: YES
CAN YOU CLIMB A FLIGHT OF STAIRS OR WALK UP A HILL: YES
CAN YOU DO HEAVY WORK AROUND THE HOUSE LIKE SCRUBBING FLOORS OR LIFTING AND MOVING HEAVY FURNITURE: NO
CAN YOU WALK A BLOCK OR TWO ON LEVEL GROUND: YES
TOTAL_SCORE: 36.7

## 2025-01-09 ASSESSMENT — PAIN - FUNCTIONAL ASSESSMENT: PAIN_FUNCTIONAL_ASSESSMENT: 0-10

## 2025-01-09 ASSESSMENT — PAIN SCALES - GENERAL: PAINLEVEL_OUTOF10: 5 - MODERATE PAIN

## 2025-01-09 NOTE — H&P (VIEW-ONLY)
"CPM/PAT Evaluation       Name: Nancy Marcus (Nancy Marcus)  /Age: 1960/64 y.o.     In-Person       Chief Complaint:     64  yr old female presents to PAT for pre-operative evaluation, with c/o shoulder pain  LEFT SHOULDER ARTHROSCOPIC ROTATOR CUFF REPAIR / DEBRIDEMENT / SUBACROMIAL DECOMPRESSION  is Scheduled on 2025  with Dr. Davis    The patient has the following past medical history:  HTN, hypothyroid, anxiety      Chief complaint:  She c/o pain to left shoulder that began in 2024 driving back fro vacation---> at rest stop, tripped and jammed/hit her shoulder while falling  Fell with \"All weight on left shoulder and may have fx ribs\"---> did not seek medical attention    She c/o continued and worsening pain with limited ROM--->presented to Ortho  Currently, she reports difficulty with ROM---> pain with lifting overhead, reaching behind, and lifting  Reports general weakness to LUE  Denies N/T  She treats pain with extra strength tylenol, meloxicam and ice    Right hand dominant    PCP- Dr. Badillo, LOV 3/15/2024    Denies fever, chills or nausea.   Denies any past issues with anesthesia.        Vitals:    25 0857   Pulse: 89   Resp: 18   Temp: 35.3 °C (95.5 °F)   SpO2: 100%          Past Medical History:   Diagnosis Date    Anxiety     Hypertension     Hypothyroidism        Past Surgical History:   Procedure Laterality Date    HYSTERECTOMY      Hysterectomy    OOPHORECTOMY         Patient  has no history on file for sexual activity.    Family History   Problem Relation Name Age of Onset    Breast cancer Mother Eleanor    F- mild MI, HLD  M-  B- HLD/ HTN    No Known Allergies    Prior to Admission medications    Medication Sig Start Date End Date Taking? Authorizing Provider   ALPRAZolam (Xanax) 0.5 mg tablet Take 1 tablet (0.5 mg) by mouth as needed at bedtime for sleep for up to 10 days. 3/15/24 3/25/24  Jey Badillo,    cetirizine (ZYRTEC) 10 mg capsule Take by mouth.    " Historical Provider, MD   chlorhexidine (Hibiclens) 4 % external liquid Apply topically once daily for 5 days. Begin using CHG for a total of 5 days before surgery Day 5 is the day of surgery See directions from the hospital 1/9/25 1/14/25  MAUDE Talavera   chlorhexidine (Peridex) 0.12 % solution Use 15 mL in the mouth or throat if needed for wound care (oral rinse the night before surgery and the morning on the day of surgery) for up to 2 doses. Swish in mouth and spit out 1/9/25   MAUDE Talavera   cholecalciferol (Vitamin D-3) 50 mcg (2,000 unit) capsule Take 2 tablets by mouth once daily.    Historical Provider, MD   cyclobenzaprine (Flexeril) 10 mg tablet Take 1 tablet (10 mg) by mouth 3 times a day as needed. 11/17/21   Historical Provider, MD   levothyroxine (Synthroid, Levoxyl) 125 mcg tablet Take 1 tablet (125 mcg) by mouth once daily. 6/11/24   Vincent P Sustersic, DO   losartan (Cozaar) 50 mg tablet Take 1 tablet (50 mg) by mouth once daily. 7/11/24   Vincent P Sustersic, DO   meloxicam (Mobic) 15 mg tablet Take 1 tablet (15 mg) by mouth once daily as needed for mild pain (1 - 3). 1/9/24   Vincent P Sustersic, DO   psyllium (MetamuciL) 0.4 gram capsule Take by mouth.    Historical Provider, MD   rosuvastatin (Crestor) 10 mg tablet Take 1 tablet (10 mg) by mouth once daily. 3/18/24 4/22/25  Jey P Sustersic, DO   sertraline (Zoloft) 100 mg tablet Take 1 tablet (100 mg) by mouth once daily. 4/9/24   Jey P Sustersic, DO   turmeric-turmeric root extract 450-50 mg capsule Take by mouth.    Historical Provider, MD          Review of Systems  Constitutional: NO F, chills, or sweats  Eyes: no blurred vision or visual disturbance  ENT: denies congestion, sore throat, difficulty hearing  Cardiovascular: no chest pain, no edema, no palps and no syncope.   Respiratory: no cough,no s.o.b. and no wheezing  Gastrointestinal: no abdominal pain, no N/V, no blood in stools  Genitourinary: no  "dysuria, no urinary frequency, no urinary hesitancy and no feelings of urinary urgency.   Musculoskeletal: see HPI, no arthralgias,  no back pain and no myalgias.   Integumentary: no new skin lesions and no rashes.   Neurological: no difficulty walking, no headache, no limb weakness, no numbness and no tingling.   Endocrine: no recent weight gain and no recent weight loss.   Hematologic/Lymphatic: no tendency for easy bruising and no swollen glands.      Physical Exam  Constitutional:       Appearance: Normal appearance.   Cardiovascular:      Rate and Rhythm: Normal rate.      Heart sounds: Normal heart sounds.   Pulmonary:      Effort: Pulmonary effort is normal.      Breath sounds: Normal breath sounds.   Abdominal:      General: Bowel sounds are normal.      Palpations: Abdomen is soft.   Musculoskeletal:      Left shoulder: Swelling and tenderness present. Decreased range of motion. Decreased strength.      Cervical back: Normal range of motion.      Right lower leg: No edema.      Left lower leg: No edema.   Skin:     General: Skin is warm and dry.   Neurological:      Mental Status: She is alert and oriented to person, place, and time.          PAT AIRWAY:   Airway:     Mallampati::  II    TM distance::  <3 FB    Neck ROM::  Full  normal        Visit Vitals  Pulse 89   Temp 35.3 °C (95.5 °F) (Tympanic)   Resp 18   Ht 1.575 m (5' 2\")   Wt 91 kg (200 lb 9.9 oz)   SpO2 100%   BMI 36.69 kg/m²   OB Status Hysterectomy   Smoking Status Never   BSA 2 m²       DASI Risk Score      Flowsheet Row Pre-Admission Testing from 1/9/2025 in [facility] [place] [facility]   Can you take care of yourself (eat, dress, bathe, or use toilet)?  2.75 filed at 01/09/2025 0857   Can you walk indoors, such as around your house? 1.75 filed at 01/09/2025 0857   Can you walk a block or two on level ground?  2.75 filed at 01/09/2025 0857   Can you climb a flight of stairs or walk up a hill? 5.5 filed at 01/09/2025 0857   Can you run a short " distance? 8 filed at 01/09/2025 0857   Can you do light work around the house like dusting or washing dishes? 2.7 filed at 01/09/2025 0857   Can you do moderate work around the house like vacuuming, sweeping floors or carrying groceries? 3.5 filed at 01/09/2025 0857   Can you do heavy work around the house like scrubbing floors or lifting and moving heavy furniture?  0 filed at 01/09/2025 0857   Can you do yard work like raking leaves, weeding or pushing a mower? 4.5 filed at 01/09/2025 0857   Can you have sexual relations? 5.25 filed at 01/09/2025 0857   Can you participate in moderate recreational activities like golf, bowling, dancing, doubles tennis or throwing a baseball or football? 0 filed at 01/09/2025 0857   Can you participate in strenous sports like swimming, singles tennis, football, basketball, or skiing? 0 filed at 01/09/2025 0857   DASI SCORE 36.7 filed at 01/09/2025 0857   METS Score (Will be calculated only when all the questions are answered) 7.3 filed at 01/09/2025 0857          Caprini DVT Assessment    No data to display       Modified Frailty Index    No data to display       CHADS2 Stroke Risk  Current as of just now        N/A 3 to 100%: High Risk   2 to < 3%: Medium Risk   0 to < 2%: Low Risk     Last Change: N/A          This score determines the patient's risk of having a stroke if the patient has atrial fibrillation.        This score is not applicable to this patient. Components are not calculated.          Revised Cardiac Risk Index    No data to display       Apfel Simplified Score    No data to display       Risk Analysis Index Results This Encounter    No data found in the last 10 encounters.       Prodigy: High Risk  Total Score: 8              Prodigy Age Score           ARISCAT Score for Postoperative Pulmonary Complications    No data to display       Greene Perioperative Risk for Myocardial Infarction or Cardiac Arrest (MARC)    No data to display         ASSESSMENT  Obesity    BMI 36.69    HTN/ HLD  Takes Losartan, Rosuvastatin  ECG 1/9/2025 NSR, 85 bpm    Hypothyroid/ benign nodule with removal  Takes Levothyroxine  TSH 1/9/2025- 4.05,  Thyroxine free pending    Anxiety/depression  Takes Zoloft and Xanax PRN       ANESTHESIA FINDINGS:  Intubation History: No history of difficult intubation  Significant Anesthesia Considerations:      Airway History: No abnormal airway history  Predictors of Difficult Airway Management  ? Obesity          CONSULTS:    Patient does not require consults for optimization at this time.     The Following Tests/Procedures Have Been Initiated and Reviewed/ interpreted by me:   CBC, BMP, TSH,  MRSA screen, ECG     Planned Anesthetic: general     Instructions Given to Patient:  *Reviewed Medications to be taken in AM of surgery or held  Patient given verbal and written preop instructions and voices comprehension and compliance.     No further testing required.      PLAN  This patient is optimally prepared for surgery.

## 2025-01-09 NOTE — CPM/PAT H&P
"CPM/PAT Evaluation       Name: Nancy Marcus (Nancy Marcus)  /Age: 1960/64 y.o.     In-Person       Chief Complaint:     64  yr old female presents to PAT for pre-operative evaluation, with c/o shoulder pain  LEFT SHOULDER ARTHROSCOPIC ROTATOR CUFF REPAIR / DEBRIDEMENT / SUBACROMIAL DECOMPRESSION  is Scheduled on 2025  with Dr. Davis    The patient has the following past medical history:  HTN, hypothyroid, anxiety      Chief complaint:  She c/o pain to left shoulder that began in 2024 driving back fro vacation---> at rest stop, tripped and jammed/hit her shoulder while falling  Fell with \"All weight on left shoulder and may have fx ribs\"---> did not seek medical attention    She c/o continued and worsening pain with limited ROM--->presented to Ortho  Currently, she reports difficulty with ROM---> pain with lifting overhead, reaching behind, and lifting  Reports general weakness to LUE  Denies N/T  She treats pain with extra strength tylenol, meloxicam and ice    Right hand dominant    PCP- Dr. Badillo, LOV 3/15/2024    Denies fever, chills or nausea.   Denies any past issues with anesthesia.        Vitals:    25 0857   Pulse: 89   Resp: 18   Temp: 35.3 °C (95.5 °F)   SpO2: 100%          Past Medical History:   Diagnosis Date    Anxiety     Hypertension     Hypothyroidism        Past Surgical History:   Procedure Laterality Date    HYSTERECTOMY      Hysterectomy    OOPHORECTOMY         Patient  has no history on file for sexual activity.    Family History   Problem Relation Name Age of Onset    Breast cancer Mother Eleanor    F- mild MI, HLD  M-  B- HLD/ HTN    No Known Allergies    Prior to Admission medications    Medication Sig Start Date End Date Taking? Authorizing Provider   ALPRAZolam (Xanax) 0.5 mg tablet Take 1 tablet (0.5 mg) by mouth as needed at bedtime for sleep for up to 10 days. 3/15/24 3/25/24  Jey Badillo,    cetirizine (ZYRTEC) 10 mg capsule Take by mouth.    " Historical Provider, MD   chlorhexidine (Hibiclens) 4 % external liquid Apply topically once daily for 5 days. Begin using CHG for a total of 5 days before surgery Day 5 is the day of surgery See directions from the hospital 1/9/25 1/14/25  MAUDE Talavera   chlorhexidine (Peridex) 0.12 % solution Use 15 mL in the mouth or throat if needed for wound care (oral rinse the night before surgery and the morning on the day of surgery) for up to 2 doses. Swish in mouth and spit out 1/9/25   MAUDE Talavera   cholecalciferol (Vitamin D-3) 50 mcg (2,000 unit) capsule Take 2 tablets by mouth once daily.    Historical Provider, MD   cyclobenzaprine (Flexeril) 10 mg tablet Take 1 tablet (10 mg) by mouth 3 times a day as needed. 11/17/21   Historical Provider, MD   levothyroxine (Synthroid, Levoxyl) 125 mcg tablet Take 1 tablet (125 mcg) by mouth once daily. 6/11/24   Vincent P Sustersic, DO   losartan (Cozaar) 50 mg tablet Take 1 tablet (50 mg) by mouth once daily. 7/11/24   Vincent P Sustersic, DO   meloxicam (Mobic) 15 mg tablet Take 1 tablet (15 mg) by mouth once daily as needed for mild pain (1 - 3). 1/9/24   Vincent P Sustersic, DO   psyllium (MetamuciL) 0.4 gram capsule Take by mouth.    Historical Provider, MD   rosuvastatin (Crestor) 10 mg tablet Take 1 tablet (10 mg) by mouth once daily. 3/18/24 4/22/25  Jey P Sustersic, DO   sertraline (Zoloft) 100 mg tablet Take 1 tablet (100 mg) by mouth once daily. 4/9/24   Jey P Sustersic, DO   turmeric-turmeric root extract 450-50 mg capsule Take by mouth.    Historical Provider, MD          Review of Systems  Constitutional: NO F, chills, or sweats  Eyes: no blurred vision or visual disturbance  ENT: denies congestion, sore throat, difficulty hearing  Cardiovascular: no chest pain, no edema, no palps and no syncope.   Respiratory: no cough,no s.o.b. and no wheezing  Gastrointestinal: no abdominal pain, no N/V, no blood in stools  Genitourinary: no  "dysuria, no urinary frequency, no urinary hesitancy and no feelings of urinary urgency.   Musculoskeletal: see HPI, no arthralgias,  no back pain and no myalgias.   Integumentary: no new skin lesions and no rashes.   Neurological: no difficulty walking, no headache, no limb weakness, no numbness and no tingling.   Endocrine: no recent weight gain and no recent weight loss.   Hematologic/Lymphatic: no tendency for easy bruising and no swollen glands.      Physical Exam  Constitutional:       Appearance: Normal appearance.   Cardiovascular:      Rate and Rhythm: Normal rate.      Heart sounds: Normal heart sounds.   Pulmonary:      Effort: Pulmonary effort is normal.      Breath sounds: Normal breath sounds.   Abdominal:      General: Bowel sounds are normal.      Palpations: Abdomen is soft.   Musculoskeletal:      Left shoulder: Swelling and tenderness present. Decreased range of motion. Decreased strength.      Cervical back: Normal range of motion.      Right lower leg: No edema.      Left lower leg: No edema.   Skin:     General: Skin is warm and dry.   Neurological:      Mental Status: She is alert and oriented to person, place, and time.          PAT AIRWAY:   Airway:     Mallampati::  II    TM distance::  <3 FB    Neck ROM::  Full  normal        Visit Vitals  Pulse 89   Temp 35.3 °C (95.5 °F) (Tympanic)   Resp 18   Ht 1.575 m (5' 2\")   Wt 91 kg (200 lb 9.9 oz)   SpO2 100%   BMI 36.69 kg/m²   OB Status Hysterectomy   Smoking Status Never   BSA 2 m²       DASI Risk Score      Flowsheet Row Pre-Admission Testing from 1/9/2025 in [facility]   Can you take care of yourself (eat, dress, bathe, or use toilet)?  2.75 filed at 01/09/2025 0857   Can you walk indoors, such as around your house? 1.75 filed at 01/09/2025 0857   Can you walk a block or two on level ground?  2.75 filed at 01/09/2025 0857   Can you climb a flight of stairs or walk up a hill? 5.5 filed at 01/09/2025 0857   Can you run a short " distance? 8 filed at 01/09/2025 0857   Can you do light work around the house like dusting or washing dishes? 2.7 filed at 01/09/2025 0857   Can you do moderate work around the house like vacuuming, sweeping floors or carrying groceries? 3.5 filed at 01/09/2025 0857   Can you do heavy work around the house like scrubbing floors or lifting and moving heavy furniture?  0 filed at 01/09/2025 0857   Can you do yard work like raking leaves, weeding or pushing a mower? 4.5 filed at 01/09/2025 0857   Can you have sexual relations? 5.25 filed at 01/09/2025 0857   Can you participate in moderate recreational activities like golf, bowling, dancing, doubles tennis or throwing a baseball or football? 0 filed at 01/09/2025 0857   Can you participate in strenous sports like swimming, singles tennis, football, basketball, or skiing? 0 filed at 01/09/2025 0857   DASI SCORE 36.7 filed at 01/09/2025 0857   METS Score (Will be calculated only when all the questions are answered) 7.3 filed at 01/09/2025 0857          Caprini DVT Assessment    No data to display       Modified Frailty Index    No data to display       CHADS2 Stroke Risk  Current as of just now        N/A 3 to 100%: High Risk   2 to < 3%: Medium Risk   0 to < 2%: Low Risk     Last Change: N/A          This score determines the patient's risk of having a stroke if the patient has atrial fibrillation.        This score is not applicable to this patient. Components are not calculated.          Revised Cardiac Risk Index    No data to display       Apfel Simplified Score    No data to display       Risk Analysis Index Results This Encounter    No data found in the last 10 encounters.       Prodigy: High Risk  Total Score: 8              Prodigy Age Score           ARISCAT Score for Postoperative Pulmonary Complications    No data to display       Greene Perioperative Risk for Myocardial Infarction or Cardiac Arrest (MARC)    No data to display         ASSESSMENT  Obesity    BMI 36.69    HTN/ HLD  Takes Losartan, Rosuvastatin  ECG 1/9/2025 NSR, 85 bpm    Hypothyroid/ benign nodule with removal  Takes Levothyroxine  TSH 1/9/2025- 4.05,  Thyroxine free pending    Anxiety/depression  Takes Zoloft and Xanax PRN       ANESTHESIA FINDINGS:  Intubation History: No history of difficult intubation  Significant Anesthesia Considerations:      Airway History: No abnormal airway history  Predictors of Difficult Airway Management  ? Obesity          CONSULTS:    Patient does not require consults for optimization at this time.     The Following Tests/Procedures Have Been Initiated and Reviewed/ interpreted by me:   CBC, BMP, TSH,  MRSA screen, ECG     Planned Anesthetic: general     Instructions Given to Patient:  *Reviewed Medications to be taken in AM of surgery or held  Patient given verbal and written preop instructions and voices comprehension and compliance.     No further testing required.      PLAN  This patient is optimally prepared for surgery.

## 2025-01-09 NOTE — PREPROCEDURE INSTRUCTIONS
Medication List            Accurate as of January 9, 2025  9:23 AM. Always use your most recent med list.                ALPRAZolam 0.5 mg tablet  Commonly known as: Xanax  Take 1 tablet (0.5 mg) by mouth as needed at bedtime for sleep for up to 10 days.     cetirizine 10 mg capsule  Commonly known as: ZYRTEC  Medication Adjustments for Surgery: Take last dose 1 day (24 hours) before surgery  Notes to patient: Hold the night before and the day of surgery= Hold x 24 hours     * chlorhexidine 4 % external liquid  Commonly known as: Hibiclens  Apply topically once daily for 5 days. Begin using CHG for a total of 5 days before surgery Day 5 is the day of surgery See directions from the hospital  Medication Adjustments for Surgery: Take/Use as prescribed     * chlorhexidine 0.12 % solution  Commonly known as: Peridex  Use 15 mL in the mouth or throat if needed for wound care (oral rinse the night before surgery and the morning on the day of surgery) for up to 2 doses. Swish in mouth and spit out  Medication Adjustments for Surgery: Take/Use as prescribed     cholecalciferol 50 mcg (2,000 unit) capsule  Commonly known as: Vitamin D-3  Additional Medication Adjustments for Surgery: Take last dose 7 days before surgery  Notes to patient: Do not take after today     cyclobenzaprine 10 mg tablet  Commonly known as: Flexeril  Medication Adjustments for Surgery: Do Not take on the morning of surgery     levothyroxine 125 mcg tablet  Commonly known as: Synthroid, Levoxyl  Take 1 tablet (125 mcg) by mouth once daily.  Medication Adjustments for Surgery: Take on the morning of surgery     losartan 50 mg tablet  Commonly known as: Cozaar  Take 1 tablet (50 mg) by mouth once daily.  Medication Adjustments for Surgery: Take last dose 1 day (24 hours) before surgery  Notes to patient: Hold the night before and the day of surgery= Hold x 24 hours     meloxicam 15 mg tablet  Commonly known as: Mobic  Take 1 tablet (15 mg) by mouth  once daily as needed for mild pain (1 - 3).  Additional Medication Adjustments for Surgery: Take last dose 7 days before surgery  Notes to patient: Do not take after today     MetamuciL 0.4 gram capsule  Generic drug: psyllium  Medication Adjustments for Surgery: Do Not take on the morning of surgery     multivitamin tablet     Primrose Oil 1,000 mg capsule  Generic drug: malachi prim-linoleic-gamolenic ac  Additional Medication Adjustments for Surgery: Take last dose 7 days before surgery     rosuvastatin 10 mg tablet  Commonly known as: Crestor  Take 1 tablet (10 mg) by mouth once daily.  Medication Adjustments for Surgery: Do Not take on the morning of surgery     sertraline 100 mg tablet  Commonly known as: Zoloft  Take 1 tablet (100 mg) by mouth once daily.  Medication Adjustments for Surgery: Do Not take on the morning of surgery     turmeric-turmeric root extract 450-50 mg capsule  Additional Medication Adjustments for Surgery: Take last dose 7 days before surgery  Notes to patient: Do not take after today           * This list has 2 medication(s) that are the same as other medications prescribed for you. Read the directions carefully, and ask your doctor or other care provider to review them with you.                                  NPO Instructions:    Do not eat any food after midnight the night before your surgery/procedure.    Additional Instructions:     Day of Surgery:  You may have clear liquids until TWO hours before surgery/procedure.  This includes water, black tea/coffee, (no milk or cream) apple juice and electrolyte drinks (Gatorade)  Wear  comfortable loose fitting clothing  Do not use moisturizers, creams, lotions or perfume  All jewelry and valuables should be left at home    PRE-OPERATIVE INSTRUCTIONS FOR SURGERY    *Do not eat anything after midnight the night of surgery.  This includes food of any kind (including hard candy, cough drops, mints).   You may have up to 13 ounces of clear liquid   until TWO hours prior to your scheduled surgery time, unless ERAS* protocol is ordered for you.  Clear liquids include water, black tea/coffee, (no milk or cream) apple juice and electrolyte drinks (GATORADE).  You may chew gum until TWO hours prior you your surgery/procedure.     *ERAS protocol: follow as instructed.  DO not drink an additional 13 ounces as noted above.        *One of our staff members will call you ONE business day before your surgery, between 11 am-2 pm to let you know the time to arrive.  If you have not received a call by 2 pm, call [phone]  *When you arrive at the hospital-->GO TO Registration on the ground floor  *Stop smoking 24 hours prior to surgery.  No Marijuana, CBD Oil or Vaping for 48 hours  *No alcohol 24 hours prior to surgery  *You will need a responsible adult to drive you home  -No acrylic nails or nail polish on at least one fingernail, NO polish on toes for foot surgery  -You may be asked to remove your dentures, partial plate, eyeglasses or contact lenses before going to surgery.  Please bring a case for these items.  -Body piercings need to be removed.  Jewelry and valuables should be left at home.  -Put on loose,  comfortable, clean clothing, that will accommodate bandages        What is a home antibacterial shower?  This shower is a way of cleaning the skin with a germ killing solution before surgery.  The solution contains chlorhexidine, commonly known as CHG.  CHG is a skin cleanser with germ killing ability.  Let your doctor know if you are allergic to chlorhexidine.    Why do I need to take a preoperative antibacterial shower?  Skin is not sterile.  It is best to try to make your skin as free of germs as possible before surgery.  Proper cleansing with a germ killing soap before surgery can lower the number of germs on your skin.  This helps to reduce the risk of infection at the surgical site.  Following the instructions listed below will help you prepare your skin  for surgery.      How do I use the solution?    Steps: Begin using your CHG soap 5 days before your surgery on __________________.    *First, wash and rinse your hair using the CHG soap.  Keep CHG soap away from ear canals and eyes.   Rinse completely, do not condition.  Hair extensions should be removed.    *Wash your face with your normal soap and rinse.   *Apply the CHG solution to a clean wet washcloth.  Turn the water off or move away from the water spray to avoid premature rinsing of the CHG soap as you are applying.  Firmly lather your entire body from the neck down.  Do not use on your face.    *Pay special attention to the area(s) where your incision(s) will be located unless they are on your face.  Avoid scrubbing your skin too hard.  The important part is to have the CHG soap sit on your skin for 3 minutes.   *When the 3 minutes are up, turn on the water and rinse the CHG solution off your body completely.  *Do not wash with regular soap after you  have  used the CHG soap solution.  *Pat  yourself dry with a clean, freshly laundered towel.  *Do not apply powders, deodorants or lotions.  *Dress in clean freshly laundered night clothes.    *Be sure to sleep with clean freshly laundered sheets.    *Be aware the CHG will cause stains on fabrics; if you wash them with bleach after use.  Rinse your washcloth and other linens that have contact with CHG completely.  Use only non-chlorine detergents to launder the items  used.  *The morning of surgery is the fifth day.  Repeat the above steps and dress in clean comfortable clothing.     What is oral/dental rinse?  It is mouthwash.  It is a way of cleaning the he mouth with a germ-killing solution before your surgery.  The solution contains chlorhexidine, commonly known as CHG.  It is used inside the mouth to kill a bacteria known as Staphylococcus aureus.  Let your doctor know if you are allergic to Chlorhexidine.    Why do I need to use CHG oral/ dental  rinse?  The CHG oral/dental rinse helps to kill bacteria in your mouth know as Staphylococcus aureus.  This reduces the risk of infection at the surgical site.    Using your CHG oral/dental rinse    STEPS:    Use your CHG oral/dental rinse after you brush your teeth the night before (at bedtime) and the morning of your surgery.  Follow all the directions on your prescription label.  *Use the cap on the container to measure 15 ml  *Swish (gargle if you can) the mouthwash in your mouth for at least 30 seconds, (do not swallow) and spit out  *After you use your CHG rinse, do not rinse your mouth with water, drink or eat.  Please refer to the prescription label for the appropriate time to resume oral intake.    What side effects might I have using the CHG oral/dental rinse?  CHG rinse will stick you plaque on the teeth.  Brush and floss just before use.   Teeth brushing will help avoid staining of the plaque during  use.  Teeth brushing will help avoid staining of plaque during  use.    Who should I contact if I have questions about the CHG oral/dental rinse and or CHG soap?  Please call [facility], Pre-Admission testing at [phone] if you have any questions.    What you may be asked to bring to surgery:  ___ abductor pillow sling    Thank You for allowing us to assist you with your procedure. Please call us with any questions Hallie PADILLA

## 2025-01-09 NOTE — TELEPHONE ENCOUNTER
Pt is calling requesting flexeril ( she is having surgery jan 17th) stating she has to stop her mobic

## 2025-01-10 DIAGNOSIS — M19.91 PRIMARY OSTEOARTHRITIS, UNSPECIFIED SITE: ICD-10-CM

## 2025-01-10 LAB
ATRIAL RATE: 85 BPM
P AXIS: 50 DEGREES
P OFFSET: 180 MS
P ONSET: 132 MS
PR INTERVAL: 168 MS
Q ONSET: 216 MS
QRS COUNT: 14 BEATS
QRS DURATION: 94 MS
QT INTERVAL: 362 MS
QTC CALCULATION(BAZETT): 430 MS
QTC FREDERICIA: 406 MS
R AXIS: 45 DEGREES
T AXIS: 41 DEGREES
T OFFSET: 397 MS
VENTRICULAR RATE: 85 BPM

## 2025-01-10 RX ORDER — MELOXICAM 15 MG/1
15 TABLET ORAL DAILY PRN
Qty: 90 TABLET | Refills: 3 | Status: SHIPPED | OUTPATIENT
Start: 2025-01-10

## 2025-01-11 LAB — STAPHYLOCOCCUS SPEC CULT: NORMAL

## 2025-01-17 ENCOUNTER — ANESTHESIA (OUTPATIENT)
Dept: OPERATING ROOM | Facility: HOSPITAL | Age: 65
End: 2025-01-17
Payer: COMMERCIAL

## 2025-01-17 ENCOUNTER — HOSPITAL ENCOUNTER (OUTPATIENT)
Facility: HOSPITAL | Age: 65
Setting detail: OUTPATIENT SURGERY
Discharge: HOME | End: 2025-01-17
Attending: ORTHOPAEDIC SURGERY | Admitting: ORTHOPAEDIC SURGERY
Payer: COMMERCIAL

## 2025-01-17 ENCOUNTER — ANESTHESIA EVENT (OUTPATIENT)
Dept: OPERATING ROOM | Facility: HOSPITAL | Age: 65
End: 2025-01-17
Payer: COMMERCIAL

## 2025-01-17 VITALS
TEMPERATURE: 97.2 F | OXYGEN SATURATION: 100 % | SYSTOLIC BLOOD PRESSURE: 118 MMHG | BODY MASS INDEX: 36.92 KG/M2 | HEART RATE: 75 BPM | RESPIRATION RATE: 15 BRPM | DIASTOLIC BLOOD PRESSURE: 64 MMHG | HEIGHT: 62 IN | WEIGHT: 200.62 LBS

## 2025-01-17 DIAGNOSIS — M19.91 PRIMARY OSTEOARTHRITIS, UNSPECIFIED SITE: ICD-10-CM

## 2025-01-17 DIAGNOSIS — S46.012A ROTATOR CUFF STRAIN, LEFT, INITIAL ENCOUNTER: Primary | ICD-10-CM

## 2025-01-17 PROCEDURE — 2500000004 HC RX 250 GENERAL PHARMACY W/ HCPCS (ALT 636 FOR OP/ED)

## 2025-01-17 PROCEDURE — C1713 ANCHOR/SCREW BN/BN,TIS/BN: HCPCS | Performed by: ORTHOPAEDIC SURGERY

## 2025-01-17 PROCEDURE — 2500000002 HC RX 250 W HCPCS SELF ADMINISTERED DRUGS (ALT 637 FOR MEDICARE OP, ALT 636 FOR OP/ED)

## 2025-01-17 PROCEDURE — 2500000004 HC RX 250 GENERAL PHARMACY W/ HCPCS (ALT 636 FOR OP/ED): Performed by: ORTHOPAEDIC SURGERY

## 2025-01-17 PROCEDURE — 3700000001 HC GENERAL ANESTHESIA TIME - INITIAL BASE CHARGE: Performed by: ORTHOPAEDIC SURGERY

## 2025-01-17 PROCEDURE — 7100000009 HC PHASE TWO TIME - INITIAL BASE CHARGE: Performed by: ORTHOPAEDIC SURGERY

## 2025-01-17 PROCEDURE — 29826 SHO ARTHRS SRG DECOMPRESSION: CPT | Performed by: ORTHOPAEDIC SURGERY

## 2025-01-17 PROCEDURE — 7100000010 HC PHASE TWO TIME - EACH INCREMENTAL 1 MINUTE: Performed by: ORTHOPAEDIC SURGERY

## 2025-01-17 PROCEDURE — 3700000002 HC GENERAL ANESTHESIA TIME - EACH INCREMENTAL 1 MINUTE: Performed by: ORTHOPAEDIC SURGERY

## 2025-01-17 PROCEDURE — 2500000005 HC RX 250 GENERAL PHARMACY W/O HCPCS: Performed by: ORTHOPAEDIC SURGERY

## 2025-01-17 PROCEDURE — 29827 SHO ARTHRS SRG RT8TR CUF RPR: CPT | Performed by: ORTHOPAEDIC SURGERY

## 2025-01-17 PROCEDURE — 7100000001 HC RECOVERY ROOM TIME - INITIAL BASE CHARGE: Performed by: ORTHOPAEDIC SURGERY

## 2025-01-17 PROCEDURE — 3600000004 HC OR TIME - INITIAL BASE CHARGE - PROCEDURE LEVEL FOUR: Performed by: ORTHOPAEDIC SURGERY

## 2025-01-17 PROCEDURE — 29823 SHO ARTHRS SRG XTNSV DBRDMT: CPT | Performed by: ORTHOPAEDIC SURGERY

## 2025-01-17 PROCEDURE — 2500000004 HC RX 250 GENERAL PHARMACY W/ HCPCS (ALT 636 FOR OP/ED): Performed by: ANESTHESIOLOGY

## 2025-01-17 PROCEDURE — 2720000007 HC OR 272 NO HCPCS: Performed by: ORTHOPAEDIC SURGERY

## 2025-01-17 PROCEDURE — 3600000009 HC OR TIME - EACH INCREMENTAL 1 MINUTE - PROCEDURE LEVEL FOUR: Performed by: ORTHOPAEDIC SURGERY

## 2025-01-17 PROCEDURE — 2780000003 HC OR 278 NO HCPCS: Performed by: ORTHOPAEDIC SURGERY

## 2025-01-17 PROCEDURE — 7100000002 HC RECOVERY ROOM TIME - EACH INCREMENTAL 1 MINUTE: Performed by: ORTHOPAEDIC SURGERY

## 2025-01-17 DEVICE — ANCHOR, BIOCOMPOSITE CORKSCREW FT, 5.5 X 14.7MM, W/TWO 1.3 SUTURE TAPE: Type: IMPLANTABLE DEVICE | Site: SHOULDER | Status: FUNCTIONAL

## 2025-01-17 RX ORDER — MEPERIDINE HYDROCHLORIDE 50 MG/ML
12.5 INJECTION INTRAMUSCULAR; INTRAVENOUS; SUBCUTANEOUS EVERY 10 MIN PRN
Status: CANCELLED | OUTPATIENT
Start: 2025-01-17

## 2025-01-17 RX ORDER — ACETAMINOPHEN 500 MG
1000 TABLET ORAL EVERY 6 HOURS PRN
Qty: 60 TABLET | Refills: 0 | Status: SHIPPED | OUTPATIENT
Start: 2025-01-17 | End: 2025-01-27

## 2025-01-17 RX ORDER — ONDANSETRON HYDROCHLORIDE 2 MG/ML
INJECTION, SOLUTION INTRAVENOUS AS NEEDED
Status: DISCONTINUED | OUTPATIENT
Start: 2025-01-17 | End: 2025-01-17

## 2025-01-17 RX ORDER — ALBUTEROL SULFATE 0.83 MG/ML
2.5 SOLUTION RESPIRATORY (INHALATION) ONCE AS NEEDED
Status: CANCELLED | OUTPATIENT
Start: 2025-01-17

## 2025-01-17 RX ORDER — ONDANSETRON HYDROCHLORIDE 2 MG/ML
4 INJECTION, SOLUTION INTRAVENOUS ONCE AS NEEDED
Status: CANCELLED | OUTPATIENT
Start: 2025-01-17

## 2025-01-17 RX ORDER — LIDOCAINE HCL/PF 100 MG/5ML
SYRINGE (ML) INTRAVENOUS AS NEEDED
Status: DISCONTINUED | OUTPATIENT
Start: 2025-01-17 | End: 2025-01-17

## 2025-01-17 RX ORDER — APREPITANT 40 MG/1
CAPSULE ORAL
Status: COMPLETED
Start: 2025-01-17 | End: 2025-01-17

## 2025-01-17 RX ORDER — ONDANSETRON 4 MG/1
4 TABLET, FILM COATED ORAL EVERY 8 HOURS PRN
Qty: 10 TABLET | Refills: 1 | Status: SHIPPED | OUTPATIENT
Start: 2025-01-17 | End: 2025-01-24

## 2025-01-17 RX ORDER — HYDROCODONE BITARTRATE AND ACETAMINOPHEN 5; 325 MG/1; MG/1
1 TABLET ORAL EVERY 6 HOURS PRN
Qty: 25 TABLET | Refills: 0 | Status: SHIPPED | OUTPATIENT
Start: 2025-01-17

## 2025-01-17 RX ORDER — KETOROLAC TROMETHAMINE 30 MG/ML
INJECTION, SOLUTION INTRAMUSCULAR; INTRAVENOUS AS NEEDED
Status: DISCONTINUED | OUTPATIENT
Start: 2025-01-17 | End: 2025-01-17

## 2025-01-17 RX ORDER — PHENYLEPHRINE HCL IN 0.9% NACL 1 MG/10 ML
SYRINGE (ML) INTRAVENOUS AS NEEDED
Status: DISCONTINUED | OUTPATIENT
Start: 2025-01-17 | End: 2025-01-17

## 2025-01-17 RX ORDER — SODIUM CHLORIDE 0.9 G/100ML
INJECTION, SOLUTION IRRIGATION AS NEEDED
Status: DISCONTINUED | OUTPATIENT
Start: 2025-01-17 | End: 2025-01-17 | Stop reason: HOSPADM

## 2025-01-17 RX ORDER — LABETALOL HYDROCHLORIDE 5 MG/ML
INJECTION, SOLUTION INTRAVENOUS AS NEEDED
Status: DISCONTINUED | OUTPATIENT
Start: 2025-01-17 | End: 2025-01-17

## 2025-01-17 RX ORDER — APREPITANT 40 MG/1
40 CAPSULE ORAL ONCE
Status: COMPLETED | OUTPATIENT
Start: 2025-01-17 | End: 2025-01-17

## 2025-01-17 RX ORDER — SCOPOLAMINE 1 MG/3D
PATCH, EXTENDED RELEASE TRANSDERMAL
Status: DISCONTINUED
Start: 2025-01-17 | End: 2025-01-17 | Stop reason: HOSPADM

## 2025-01-17 RX ORDER — ROCURONIUM BROMIDE 10 MG/ML
INJECTION, SOLUTION INTRAVENOUS AS NEEDED
Status: DISCONTINUED | OUTPATIENT
Start: 2025-01-17 | End: 2025-01-17

## 2025-01-17 RX ORDER — ESMOLOL HYDROCHLORIDE 10 MG/ML
INJECTION INTRAVENOUS AS NEEDED
Status: DISCONTINUED | OUTPATIENT
Start: 2025-01-17 | End: 2025-01-17

## 2025-01-17 RX ORDER — FENTANYL CITRATE 50 UG/ML
INJECTION, SOLUTION INTRAMUSCULAR; INTRAVENOUS AS NEEDED
Status: DISCONTINUED | OUTPATIENT
Start: 2025-01-17 | End: 2025-01-17

## 2025-01-17 RX ORDER — MIDAZOLAM HYDROCHLORIDE 1 MG/ML
INJECTION, SOLUTION INTRAMUSCULAR; INTRAVENOUS
Status: COMPLETED
Start: 2025-01-17 | End: 2025-01-17

## 2025-01-17 RX ORDER — SODIUM CHLORIDE, SODIUM LACTATE, POTASSIUM CHLORIDE, CALCIUM CHLORIDE 600; 310; 30; 20 MG/100ML; MG/100ML; MG/100ML; MG/100ML
INJECTION, SOLUTION INTRAVENOUS CONTINUOUS PRN
Status: DISCONTINUED | OUTPATIENT
Start: 2025-01-17 | End: 2025-01-17

## 2025-01-17 RX ORDER — ACETAMINOPHEN 325 MG/1
650 TABLET ORAL EVERY 4 HOURS PRN
Status: CANCELLED | OUTPATIENT
Start: 2025-01-17

## 2025-01-17 RX ORDER — SCOPOLAMINE 1 MG/3D
1 PATCH, EXTENDED RELEASE TRANSDERMAL
Status: DISCONTINUED | OUTPATIENT
Start: 2025-01-17 | End: 2025-01-17 | Stop reason: HOSPADM

## 2025-01-17 RX ORDER — PROPOFOL 10 MG/ML
INJECTION, EMULSION INTRAVENOUS AS NEEDED
Status: DISCONTINUED | OUTPATIENT
Start: 2025-01-17 | End: 2025-01-17

## 2025-01-17 RX ORDER — MIDAZOLAM HYDROCHLORIDE 1 MG/ML
INJECTION, SOLUTION INTRAMUSCULAR; INTRAVENOUS AS NEEDED
Status: DISCONTINUED | OUTPATIENT
Start: 2025-01-17 | End: 2025-01-17

## 2025-01-17 RX ORDER — DEXAMETHASONE SODIUM PHOSPHATE 10 MG/ML
6 INJECTION INTRAMUSCULAR; INTRAVENOUS ONCE
Status: CANCELLED | OUTPATIENT
Start: 2025-01-17 | End: 2025-01-17

## 2025-01-17 RX ADMIN — LIDOCAINE HYDROCHLORIDE 60 MG: 20 INJECTION INTRAVENOUS at 13:37

## 2025-01-17 RX ADMIN — SCOPOLAMINE 1 PATCH: 1 PATCH, EXTENDED RELEASE TRANSDERMAL at 12:32

## 2025-01-17 RX ADMIN — ESMOLOL HYDROCHLORIDE 30 MG: 100 INJECTION, SOLUTION INTRAVENOUS at 15:03

## 2025-01-17 RX ADMIN — APREPITANT 40 MG: 40 CAPSULE ORAL at 12:32

## 2025-01-17 RX ADMIN — KETOROLAC TROMETHAMINE 15 MG: 30 INJECTION, SOLUTION INTRAMUSCULAR at 14:58

## 2025-01-17 RX ADMIN — SODIUM CHLORIDE, SODIUM LACTATE, POTASSIUM CHLORIDE, AND CALCIUM CHLORIDE: 600; 310; 30; 20 INJECTION, SOLUTION INTRAVENOUS at 13:33

## 2025-01-17 RX ADMIN — ROCURONIUM BROMIDE 50 MG: 10 INJECTION, SOLUTION INTRAVENOUS at 13:37

## 2025-01-17 RX ADMIN — FENTANYL CITRATE 100 MCG: 50 INJECTION, SOLUTION INTRAMUSCULAR; INTRAVENOUS at 13:37

## 2025-01-17 RX ADMIN — MIDAZOLAM 2 MG: 1 INJECTION INTRAMUSCULAR; INTRAVENOUS at 12:32

## 2025-01-17 RX ADMIN — CEFAZOLIN 2 G: 2 INJECTION, POWDER, FOR SOLUTION INTRAMUSCULAR; INTRAVENOUS at 13:39

## 2025-01-17 RX ADMIN — SUGAMMADEX 200 MG: 100 INJECTION, SOLUTION INTRAVENOUS at 15:12

## 2025-01-17 RX ADMIN — SCOPOLAMINE 1 PATCH: 1.5 PATCH, EXTENDED RELEASE TRANSDERMAL at 12:32

## 2025-01-17 RX ADMIN — ESMOLOL HYDROCHLORIDE 40 MG: 100 INJECTION, SOLUTION INTRAVENOUS at 15:09

## 2025-01-17 RX ADMIN — PROPOFOL 200 MG: 10 INJECTION, EMULSION INTRAVENOUS at 13:37

## 2025-01-17 RX ADMIN — POVIDONE-IODINE 1 APPLICATION: 5 SOLUTION TOPICAL at 12:09

## 2025-01-17 RX ADMIN — LABETALOL HYDROCHLORIDE 10 MG: 5 INJECTION INTRAVENOUS at 15:13

## 2025-01-17 RX ADMIN — ROCURONIUM BROMIDE 10 MG: 10 INJECTION, SOLUTION INTRAVENOUS at 14:41

## 2025-01-17 RX ADMIN — ROCURONIUM BROMIDE 20 MG: 10 INJECTION, SOLUTION INTRAVENOUS at 14:15

## 2025-01-17 RX ADMIN — ONDANSETRON 4 MG: 2 INJECTION INTRAMUSCULAR; INTRAVENOUS at 14:59

## 2025-01-17 RX ADMIN — Medication 50 MCG: at 13:54

## 2025-01-17 RX ADMIN — DEXAMETHASONE SODIUM PHOSPHATE 4 MG: 4 INJECTION, SOLUTION INTRAMUSCULAR; INTRAVENOUS at 13:48

## 2025-01-17 SDOH — HEALTH STABILITY: MENTAL HEALTH: CURRENT SMOKER: 0

## 2025-01-17 ASSESSMENT — PAIN - FUNCTIONAL ASSESSMENT
PAIN_FUNCTIONAL_ASSESSMENT: 0-10

## 2025-01-17 ASSESSMENT — PAIN SCALES - GENERAL
PAINLEVEL_OUTOF10: 0 - NO PAIN
PAINLEVEL_OUTOF10: 5 - MODERATE PAIN
PAINLEVEL_OUTOF10: 0 - NO PAIN
PAINLEVEL_OUTOF10: 5 - MODERATE PAIN
PAINLEVEL_OUTOF10: 0 - NO PAIN
PAINLEVEL_OUTOF10: 5 - MODERATE PAIN
PAINLEVEL_OUTOF10: 0 - NO PAIN
PAINLEVEL_OUTOF10: 5 - MODERATE PAIN
PAINLEVEL_OUTOF10: 0 - NO PAIN
PAINLEVEL_OUTOF10: 0 - NO PAIN

## 2025-01-17 ASSESSMENT — COLUMBIA-SUICIDE SEVERITY RATING SCALE - C-SSRS
1. IN THE PAST MONTH, HAVE YOU WISHED YOU WERE DEAD OR WISHED YOU COULD GO TO SLEEP AND NOT WAKE UP?: NO
2. HAVE YOU ACTUALLY HAD ANY THOUGHTS OF KILLING YOURSELF?: NO
6. HAVE YOU EVER DONE ANYTHING, STARTED TO DO ANYTHING, OR PREPARED TO DO ANYTHING TO END YOUR LIFE?: NO

## 2025-01-17 ASSESSMENT — PAIN DESCRIPTION - DESCRIPTORS: DESCRIPTORS: SHARP;THROBBING

## 2025-01-17 NOTE — DISCHARGE INSTRUCTIONS
Scopolamine is used to prevent nausea and vomiting caused by motion sickness or medications used during surgery.  When used to prevent nausea and vomiting from medications used with surgery leave it in place for 24 hours after your surgery.  OK to leave it in place for up to 3 days.  Do not touch patch and touch/rub your eyes!    Limit contact with water while swimming and bathing because it may cause the patch may fall off. If the scopolamine patch falls off, discard the patch, and apply a new one on the hairless area behind the other ear.    When the scopolamine patch is no longer needed, remove the patch and fold it in half with the sticky side together and dispose of it. Wash your hands and the area behind your ear thoroughly with soap and water to remove any traces of scopolamine from the area.    Scopolamine patches may cause side effects: disorientation, dry mouth, drowsiness, dilated pupils, dizziness, sweating,  sore throat.

## 2025-01-17 NOTE — ANESTHESIA PROCEDURE NOTES
"Peripheral Block    Patient location during procedure: pre-op  Start time: 1/17/2025 12:32 PM  End time: 1/17/2025 12:43 PM  Reason for block: at surgeon's request and post-op pain management  Staffing  Performed: attending   Authorized by: Abel Chavez MD    Performed by: Abel Chavez MD  Preanesthetic Checklist  Completed: patient identified, IV checked, site marked, risks and benefits discussed, surgical consent, monitors and equipment checked, pre-op evaluation and timeout performed   Timeout performed at: 1/17/2025 12:31 PM  Peripheral Block  Patient position: Head up.  Prep: ChloraPrep  Patient monitoring: heart rate, continuous pulse ox and cardiac monitor  Block type: interscalene  Laterality: left  Injection technique: single-shot  Guidance: ultrasound guided  Local infiltration: lidocaine  Infiltration strength: 1 %  Dose: 1 mL  Needle  Needle gauge: 22 G  Needle length: 5 cm  Needle localization: ultrasound guidance  Needle insertion depth: 3 cm  Test dose: negative  Assessment  Injection assessment: negative aspiration for heme, no paresthesia on injection, incremental injection and local visualized surrounding nerve on ultrasound  Paresthesia pain: none  Heart rate change: no  Slow fractionated injection: yes  Additional Notes  Ropivacaine 0.5% 25 ml\"s with Epinephrine 1:200,000 and Methylprednisolone 40 mg given incrementally in 3 ml's volume with negative aspirations. Patient awake throughout. The patient tolerated the procedure well.           "

## 2025-01-17 NOTE — ANESTHESIA POSTPROCEDURE EVALUATION
Patient: Nancy Marcus    Procedure Summary       Date: 01/17/25 Room / Location: [facility] 06 / [facility]    Anesthesia Start: 1332 Anesthesia Stop: 1520    Procedures:       LEFT SHOULDER ARTHROSCOPIC ROTATOR CUFF REPAIR (Left: Shoulder)      DEBRIDEMENT (Left: Shoulder)      SUBACROMIAL DECOMPRESSION (Left: Shoulder) Diagnosis:       Rotator cuff strain, left, initial encounter      (Rotator cuff strain, left, initial encounter [S46.012A])    Surgeons: Burton Davis MD Responsible Provider: Dinesh Hawk MD    Anesthesia Type: general, regional ASA Status: 3            Anesthesia Type: general, regional    Vitals Value Taken Time   /73 01/17/25 1518   Temp 36.2 °C (97.2 °F) 01/17/25 1518   Pulse 74 01/17/25 1518   Resp 16 01/17/25 1518   SpO2 99 % 01/17/25 1518       Anesthesia Post Evaluation    Patient location during evaluation: PACU  Patient participation: complete - patient participated  Level of consciousness: awake and alert  Pain management: adequate  Airway patency: patent  Cardiovascular status: acceptable  Respiratory status: acceptable  Hydration status: acceptable  Postoperative Nausea and Vomiting: none        There were no known notable events for this encounter.

## 2025-01-17 NOTE — ANESTHESIA PREPROCEDURE EVALUATION
Patient: Nancy Marcus    Procedure Information       Date/Time: 01/17/25 1325    Procedures:       LEFT SHOULDER ARTHROSCOPIC ROTATOR CUFF REPAIR (Left: Shoulder) - Scalene Block      DEBRIDEMENT (Left: Shoulder)      SUBACROMIAL DECOMPRESSION (Left: Shoulder)    Location: [facility] / [facility]    Surgeons: Burton Davis MD            Relevant Problems   Anesthesia (within normal limits)      Cardiac   (+) Benign essential hypertension      Pulmonary   (+) ALEKSANDR (obstructive sleep apnea)      Neuro   (+) Other specified anxiety disorders      /Renal   (+) Acute UTI      Endocrine   (+) Hypothyroidism      Musculoskeletal   (+) Generalized osteoarthritis of multiple sites   (+) Primary osteoarthritis of left foot   (+) Primary osteoarthritis of right foot      ID   (+) Acute UTI      Skin   (+) Eczema       Clinical information reviewed:                   NPO Detail:  No data recorded     Physical Exam    Airway  Mallampati: III  TM distance: >3 FB  Neck ROM: full     Cardiovascular - normal exam     Dental - normal exam     Pulmonary - normal exam     Abdominal   (+) obese             Anesthesia Plan    History of general anesthesia?: yes  History of complications of general anesthesia?: no    ASA 3     general and regional     The patient is not a current smoker.    intravenous induction   Postoperative administration of opioids is intended.  Trial extubation is planned.  Anesthetic plan and risks discussed with patient.    Plan discussed with CRNA.

## 2025-01-17 NOTE — OP NOTE
LEFT SHOULDER ARTHROSCOPIC ROTATOR CUFF REPAIR (L), DEBRIDEMENT (L), SUBACROMIAL DECOMPRESSION (L) Operative Note     Date: 2025  OR Location: [facility]    Name: Nancy Marcus, : 1960, Age: 64 y.o., MRN: [id number], Sex: female    Diagnosis  Pre-op Diagnosis      * Rotator cuff strain, left, initial encounter [S46.012A] Post-op Diagnosis     * Rotator cuff strain, left, initial encounter [S46.012A]     Procedures    Left shoulder arthroscopy with rotator cuff repair and extensive debridement and subacromial decompression    Surgeons      * Burton Davis - Primary    Resident/Fellow/Other Assistant:    Vu Short    Staff:   Circulator: Krystina  Circulator: Precious Villalobos Person: Kylee  Surgical Assistant: Vu Shepard Scrub: Chelo    Anesthesia Staff: Anesthesiologist: Dinesh Hawk MD; Abel Chavez MD  CRNA: MILDRED Otto-CRNA, ABDELRAHMAN    Procedure Summary  Anesthesia: Regional, General  ASA: III  Estimated Blood Loss: 5mL    Indications: 64-year-old with left shoulder pain.  MRI demonstrates rotator cuff tear.  Treatment options including no treatment reviewed and the decision was made to proceed with surgery.    Description of procedure: Patient was brought in the op room after scalene block was performed.  General anesthesia was performed.  She was positioned in the lateral cubitus position and prepped and draped in the usual fashion.  The joint was injected with saline and a posterior thrusted portal established.  Arthroscopic semination of the joint was performed.  There is full-thickness tear involving the supraspinatus and infraspinatus.  Tearing along the upper edge of the subscapularis.  The subscapularis tendon was intact to the lesser tuberosity.  There was tearing involving the superior labrum anteriorly and posteriorly.  The anterior inferior and posterior inferior labrum were intact.  Bicep tendon had a normal appearance and was intact at the superior labrum.  Anterior  portal was established.  Motorized shaver was introduced.  Motorized shaver was used to perform debridement of the superior labrum.  Motorized shaver was also used to perform debridement of the subscapularis partial articular sided tear.  There was grade II chondromalacia involving the superior aspect the glenoid.  The motorized shaver was used to perform chondroplasty along the glenoid.  There was grade II chondromalacia along the posterior superior humeral head and the motorized shaver was used perform chondroplasty along the humeral head.  The arthroscope was placed in the subacromial space.  There is extensive subacromial bursitis.  A lateral portal was established.  Using combination of the motorized shaver and the radiofrequency wand subacromial bursectomy was performed.  The CA ligament was released.  There was an anterior impinging osteophyte.  Using the motorized bur acromioplasty was performed till there was adequate decompression of the subacromial space.  There is osteophyte along the distal clavicle and with a motorized bur osteophyte along the distal clavicle was removed.  Rotator cuff tear was visualized and the rotator cuff was debrided to healthier appearing tissue.  Soft tissue debrided from the greater tuberosity to create a bleeding surface.  Liberator elevator was used to mobilize the rotator cuff.  There was felt that there was significant retraction of the rotator cuff particularly the supraspinatus portion of the tear could not be fully mobilized back to the greater tuberosity.  Decision was made to proceed with margin convergence repair and 2 FiberWire sutures were passed in margin convergence type pattern and then tied down with arthroscopic knot tying and this provided for convergence of the tear and a significant reduction in the defect size.  Then a Arthrex bio composite corkscrew anchor was placed through an accessory portal and this had good purchase in the bone.  The sutures were  passed in a simple type fashion through the rotator cuff and then tied down with arthroscopic knot tying providing secure repair of the rotator cuff to the greater tuberosity.  Repair was probed and was felt to be very secure.  The shoulder was well irrigated.  Hemostasis obtained.  The instruments removed.  Portals closed with nylon suture and a sterile dressing and a sling were applied she tolerated the procedure well and was taken come in stable condition.    Intra-op Medications:   Administrations occurring from 1325 to 1550 on 01/17/25:   Medication Name Total Dose   sodium chloride 0.9 % irrigation solution 4,000 mL   ceFAZolin (Ancef) 2 g in dextrose 5%  mL 2 g   dexAMETHasone (Decadron) injection 4 mg/mL 4 mg   esmolol (Brevibloc) injection 70 mg   fentaNYL PF 0.05 mg/mL 100 mcg   ketorolac (Toradol) injection 30 mg 15 mg   labetalol (Normodyne,Trandate) injection 10 mg   LR infusion Cannot be calculated   lidocaine (cardiac) injection 2% prefilled syringe 60 mg   ondansetron (Zofran) 2 mg/mL injection 4 mg   phenylephrine 100 mcg/mL syringe 10 mL (prefilled) 50 mcg   propofol (Diprivan) injection 10 mg/mL 200 mg   rocuronium (ZeMuron) 50 mg/5 mL injection 80 mg   sugammadex (Bridion) 200 mg/2 mL injection 200 mg              Anesthesia Record               Intraprocedure I/O Totals          Intake    LR infusion 600.00 mL    ceFAZolin (Ancef) 2 g in dextrose 5%  mL 100.00 mL    Total Intake 700 mL          Specimen: No specimens collected       Implants:  Implants       Type Name Action Serial No.      Screw ANCHOR, BIOCOMPOSITE CORKSCREW FT, 5.5 X 14.7MM, W/TWO 1.3 SUTURE TAPE - [id number] Implanted                 Task Performed by RNFA or Surgical Assistant:  The surgical assistant was required for positioning of the arthroscope and patient's arm and assistant with anchor placement and suture passing throughout the procedure.        Attending Attestation: I performed the procedure.    Burton BOURNE  Ryan  Phone Number: [phone]

## 2025-01-17 NOTE — ANESTHESIA PROCEDURE NOTES
Airway  Date/Time: 1/17/2025 1:39 PM  Urgency: elective    Airway not difficult    Staffing  Performed: CRNA   Authorized by: Abel Chavez MD    Performed by: MILDRED Otto-CRNA, ABDELRAHMAN  Patient location during procedure: OR    Indications and Patient Condition  Indications for airway management: anesthesia  Spontaneous Ventilation: absent  Sedation level: deep  Preoxygenated: yes  Patient position: sniffing  MILS maintained throughout  Mask difficulty assessment: 1 - vent by mask  Planned trial extubation    Final Airway Details  Final airway type: endotracheal airway      Successful airway: ETT  Cuffed: yes   Successful intubation technique: video laryngoscopy  Facilitating devices/methods: intubating stylet  Endotracheal tube insertion site: oral  Blade: Ama  Blade size: #3  ETT size (mm): 7.0  Cormack-Lehane Classification: grade IIa - partial view of glottis  Placement verified by: chest auscultation and capnometry   Cuff volume (mL): 10  Measured from: lips  ETT to lips (cm): 21  Number of attempts at approach: 1  Ventilation between attempts: none  Number of other approaches attempted: 0

## 2025-01-20 RX ORDER — MELOXICAM 15 MG/1
15 TABLET ORAL DAILY PRN
Qty: 90 TABLET | Refills: 3 | Status: SHIPPED | OUTPATIENT
Start: 2025-01-20

## 2025-01-24 ENCOUNTER — OFFICE VISIT (OUTPATIENT)
Dept: ORTHOPEDIC SURGERY | Facility: CLINIC | Age: 65
End: 2025-01-24
Payer: COMMERCIAL

## 2025-01-24 DIAGNOSIS — Z98.890 STATUS POST ARTHROSCOPY OF LEFT SHOULDER: ICD-10-CM

## 2025-01-24 PROCEDURE — 99211 OFF/OP EST MAY X REQ PHY/QHP: CPT | Performed by: ORTHOPAEDIC SURGERY

## 2025-01-24 NOTE — PROGRESS NOTES
Seen today following left shoulder arthroscopy with rotator cuff repair of a massive rotator cuff tear on January 17, 2025.. Doing better and having less pain.    The portals are healed without evidence of infection.    The sutures were removed and Steri-Strips applied. The arthroscopic pictures and postoperative precautions were reviewed. Physical therapy will be started. Follow up in 6 weeks.

## 2025-02-18 ENCOUNTER — EVALUATION (OUTPATIENT)
Dept: PHYSICAL THERAPY | Facility: CLINIC | Age: 65
End: 2025-02-18
Payer: COMMERCIAL

## 2025-02-18 DIAGNOSIS — Z98.890 STATUS POST ARTHROSCOPY OF LEFT SHOULDER: ICD-10-CM

## 2025-02-18 PROCEDURE — 97110 THERAPEUTIC EXERCISES: CPT | Mod: GP | Performed by: PHYSICAL THERAPIST

## 2025-02-18 PROCEDURE — 97161 PT EVAL LOW COMPLEX 20 MIN: CPT | Mod: GP | Performed by: PHYSICAL THERAPIST

## 2025-02-18 ASSESSMENT — PATIENT HEALTH QUESTIONNAIRE - PHQ9
1. LITTLE INTEREST OR PLEASURE IN DOING THINGS: NOT AT ALL
SUM OF ALL RESPONSES TO PHQ9 QUESTIONS 1 AND 2: 0
2. FEELING DOWN, DEPRESSED OR HOPELESS: NOT AT ALL

## 2025-02-18 NOTE — PROGRESS NOTES
Physical Therapy    Physical Therapy Evaluation    Patient Name: Nancy Marcus  MRN: [id number]  Today's Date: 2/18/2025  Time Calculation  Start Time: 1045  Stop Time: 1120  Time Calculation (min): 35 min    Problem List Items Addressed This Visit    None  Visit Diagnoses         Codes    Status post arthroscopy of left shoulder     Z98.890    Relevant Orders    Follow Up In Physical Therapy            Insurance:  Visit number: 1 of 20V PCY 0 USED NO AUTH NEEDED PAYS AT 65% AFTER DED 2800.00   Insurance Type: Payor: [employer] [facility] / Plan: [employer] [facility] HMO / Product Type: *No Product type* /   Insurance is changing    General:  Surgery: Left Shoulder Arthroscopic Rotator Cuff Repair - Left, Debridement - Left, and Subacromial Decompression - Left  Date of Last Surgery: 1/17/2025   Post-Op Days: 32   Referred by: Burton Davis MD  Next MD appt:  3/4/25     Precautions:  MASSIVE TEAR MODIFICATION - Full thickness of supraspinatus and infraspinatus, (subcap was intact)  Fall Risk: None     Assessment:   Nancy Marcus is postop left Rotator cuff repair.  Date of surgery: 1/17/2025   MD restrictions are as follows: MASSIVE TEAR Protocol.  Reports not able to lift arm > 90 after fall and prior to surgery    Clinical Presentation: Stable and/or uncomplicated characteristics    Impairments include:  PROM  AROM  Strength  Knowledge of precautions  HEP    Functional deficits include:  Reaching  Lifting  Sleeping  Driving    Recommended Treatment:   Education about the condition, activity modification, pain management with ice, heat, or medications(as recommended by MD), posture education, correction of dysfunctional movement patterns, stretching, and strengthening exercises. Therapeutic exercise, Manual therapy, Home program instruction and progression, Neuromuscular re-education, Therapeutic activities, Self care and home management, Instruction in activity modification, Electrical stimulation,  Vasopneumatic device + cold, and Cryotherapy    Plan:  Plan of care was developed with input and agreement by the patient.  0-2 x per week for 24 weeks.    Rehab Potential: Good to achieve goals.    Goals:  Short term goals:   -Patient will demo correct posture with min to no cueing to allow for correct loading strategy.    -Restore full shoulder AROM    -Patient to demonstrate correct performance of HEP for symptom management    -Pain< 4/10    Long term goals:   -QuickDash= 19 % disability  to indicate a significant improvement in overall function.     -Patient will demonstrate 4/5 rotator cuff strength (all planes) to allow for correct reach/lift mechanics.    -Patient will demo mild to no limitation AROM of the R shoulder to allow for correct mechanics with functional mobility.     -Patient will report reaching overhead without pain to allow for return to work and ADLs without limitation.    -Patient will report driving without pain to allow for return to work and ADLs without limitation.     -Patient will return to work.    Subjective:    VANNESA:  Fall - mechanical  DOI: 9/2024   PAIN - Location: Left shoulder Worst(past 24 hours): 1  Least(past 24 hours): 0  Pain Quality: aching and throbbing  PAIN - Alleviating: acetaminophen, rest, ice, and brace - SLING  MEDICAL MANAGEMENT: Referred to Physical Therapy, Radiographs, MRI, OTC medication, and Orthopedic specialist  PLOF: Independent and pain free and She is R handed.  FUNCTIONAL LIMITATIONS: Reaching, Lifting, Dressing, Bathing, and Sleep is interrupted.   LIVING ENVIRONMENT: lives with spouse  WORK: retired  EXERCISE: Patient does not exercise.  She was using some weight for her Ues and walking.  She enjoys riding the ebike.  Patient Stated Goal:  Get back to Ebike riding.    Medical History Form: Reviewed (scanned into chart)    Objective:   - POSTURE: Posture: forward head shoulders rounded forward scapular protraction    - INCISION: Healed and no sign of  "infection.    - SHOULDER ROM:   Shoulder ROM: Left shoulder PROM: .  Flexion: 90  External rotation in 0 Abd: 5    - MUSCLE STRENGTH:  Not tested.    SENSATION:   Patient denies altered sensation in the LUE extremities.    - Elbow AROM    Outcome Measures:  Other Measures  Disability of Arm Shoulder Hand (DASH): 38    Treatment Performed: (\"NP\" = Not Performed)     Therapeutic Exercise:     25 minutes  Home exercise program instructed and issued.  Access Code: [email or web address]    Standing Shoulder External Rotation AAROM with Dowel  10 reps - 3 second hold  Seated Scapular Retraction   10 reps - 3 second hold  Flexion-Extension Shoulder Pendulum with Table Support  30 reps  Seated Elbow Flexion and Extension AROM  20 reps  Seated Forearm Pronation and Supination AROM  - 20 reps  Supine Shoulder Flexion AAROM with Hands Clasped  2 sets - 10 reps (Do not go over 90 degrees per protocol)     Manual Therapy:       minutes      Neuromuscular Re-education:      minutes      Gait Training:            minutes      Aquatics:            minutes      Therapeutic Activity:      minutes      Modalities:       Vasopneumatic Device       minutes  Electrical Stimulation          minutes  Ultrasound            minutes  Iontophoresis                     minutes  Cold Pack            minutes  Mechanical Traction           minutes    Self Care Home Management:    minutes    Canalith Reposition:          minutes     Education:          minutes    Other:       minutes      Evaluation Complexity: Low: 10 minutes; Moderate   minutes; Complex PT Evaluation Time Entry: 10;  minutes    Re-Evaluation:   minutes     "

## 2025-02-21 ENCOUNTER — APPOINTMENT (OUTPATIENT)
Dept: PHYSICAL THERAPY | Facility: CLINIC | Age: 65
End: 2025-02-21
Payer: COMMERCIAL

## 2025-02-24 NOTE — PROGRESS NOTES
PHYSICAL THERAPY TREATMENT NOTE    Patient Name:  Nancy Marcus   Patient MRN: [id number]  Date: 2/25/2025  Time Calculation  Start Time: 0830  Stop Time: 0910  Time Calculation (min): 40 min      Problem List Items Addressed This Visit    None  Visit Diagnoses         Codes    Status post arthroscopy of left shoulder    -  Primary Z98.890            Insurance:  Visit number: 2 of 20V PCY 0 USED NO AUTH NEEDED PAYS AT 65% AFTER DED 2800.00   Insurance Type: Payor: MEDICAL [facility] / Plan: [employer] [facility] HMO / Product Type: *No Product type* /   Insurance is changing    General:  Surgery: Left Shoulder Arthroscopic Rotator Cuff Repair - Left, Debridement - Left, and Subacromial Decompression - Left  Date of Last Surgery: 1/17/2025   Post-Op Days: 32   Referred by: Burton Davis MD  Next MD appt:  3/4/25     Precautions:  MASSIVE TEAR MODIFICATION - Full thickness of supraspinatus and infraspinatus, (subcap was intact)  Fall Risk: None    Subjective:   Nancy reports she feels like her condition is improving. Patient reports HEP going well.   Pain (0-10): 2 / 10 in the R shoulder.  Tylenol PRN.   Continues to apply ice.   HEP adherence / understanding: patient reports compliance with the instructed home exercises.    Assessment:  5 weeks and 4 days post op.  Education: Home exercise program instructed and issued.  Progress towards functional goals:  Wearing sling.  Adhering to restriction - no lifting/push/pull/carry.  Having minimal pain.  Using tylenol PRN and ice.  Completing HEP regularly.  Response to interventions: Patient tolerated therapeutic interventions well and without any adverse events.  No more than 2/10 throughout todays PT session.  Justification for continued skilled care: Skilled intervention required to improve ROM which will improve function. Modify and  "progress home exercise program.    Plan:  F/u with Dr Davis on Tuesday and come to therapy afterwards.  Will be 6 weeks post op at that point.  Can progress per treatment section..    Objective:       Treatment Performed: (\"NP\" = Not Performed)     Therapeutic Exercise:     30 minutes  Home exercise program instructed and issued on 2/25/25  Access Code: [email or web address]    Standing Shoulder External Rotation AAROM with Dowel  10 reps - 3 second hold  Seated Scapular Retraction   10 reps - 3 second hold  Flexion-Extension Shoulder Pendulum with Table Support  30 reps  Seated Elbow Flexion and Extension AROM  20 reps  Seated Forearm Pronation and Supination AROM  - 20 reps  Supine Shoulder Flexion AAROM with Hands Clasped  2 sets - 10 reps (Do not go over 90 degrees per protocol)   L stretch at the counter top  Submax, low intensity isometrics: ER/IR/FLEX/EXT 5\" x 10 ea     On 2/28/25 may progress to: AAROM per patient tolerance - adding abduction, horizontal abduction (maintain subscapularis precautions)  Ball on wall, UE swiss ball mobility -IR/ER  Towel wipes on table - any direction Strengthening  Closed-chain stability - elbow extension with hand on ball performing oscillations  Progress scapular neuromuscular strengthening  Initiate SUB-MAX/50% effort strengthening  Isometric flexion, extension, abduction, ER, IR  Isometric lower trap WEEK 8-10 ROM § AROM per patient tolerance; avoid scapular substitution    Manual Therapy:     10 minutes      Neuromuscular Re-education:      minutes      Modalities:       Vasopneumatic Device       minutes  Electrical Stimulation          minutes  Ultrasound            minutes  Iontophoresis                     minutes  Cold Pack            minutes  Mechanical Traction           minutes    Gait Training:            minutes      Aquatics:            minutes      Therapeutic Activity:      minutes      Self Care Home Management:    minutes    Canalith Reposition:          minutes "     Education:          minutes    Other:       minutes      Evaluation Complexity: Low:   minutes; Moderate   minutes; Complex   minutes    Re-Evaluation:   minutes

## 2025-02-25 ENCOUNTER — TREATMENT (OUTPATIENT)
Dept: PHYSICAL THERAPY | Facility: CLINIC | Age: 65
End: 2025-02-25
Payer: COMMERCIAL

## 2025-02-25 DIAGNOSIS — Z98.890 STATUS POST ARTHROSCOPY OF LEFT SHOULDER: Primary | ICD-10-CM

## 2025-02-25 PROCEDURE — 97110 THERAPEUTIC EXERCISES: CPT | Mod: GP | Performed by: PHYSICAL THERAPIST

## 2025-02-25 PROCEDURE — 97140 MANUAL THERAPY 1/> REGIONS: CPT | Mod: GP | Performed by: PHYSICAL THERAPIST

## 2025-02-28 ENCOUNTER — APPOINTMENT (OUTPATIENT)
Dept: PHYSICAL THERAPY | Facility: CLINIC | Age: 65
End: 2025-02-28
Payer: COMMERCIAL

## 2025-03-03 NOTE — PROGRESS NOTES
PHYSICAL THERAPY TREATMENT NOTE    Patient Name:  Nancy Marcus   Patient MRN: [id number]  Date: 3/4/2025  Time Calculation  Start Time: 0910  Stop Time: 0950  Time Calculation (min): 40 min      Problem List Items Addressed This Visit    None  Visit Diagnoses         Codes    Status post arthroscopy of left shoulder    -  Primary Z98.890            Insurance:  Visit number: 3 of 20V PCY 0 USED NO AUTH NEEDED PAYS AT 65% AFTER DED 2800.00   Insurance Type: Payor: MEDICAL [facility] / Plan: [facility] HMO / Product Type: *No Product type* /   Insurance is changing    General:  Surgery: Left Shoulder Arthroscopic Rotator Cuff Repair - Left, Debridement - Left, and Subacromial Decompression - Left  Date of Last Surgery: 1/17/2025   6 weeks post op = 2/28/25  12 weeks post op = 4/11/25  16 weeks = 5/9/24  Post-Op Days: 32   Referred by: Burton Davis MD  Next MD appt:  3/4/25     Precautions:  MASSIVE TEAR MODIFICATION - Full thickness of supraspinatus and infraspinatus, (subcap was intact)  Fall Risk: None    Subjective:   Nancy reports she feels like her condition is improving. Patient reports Patient reports there has not been a significant change in functional abilities.   Pain (0-10): 1    HEP adherence / understanding: patient reports compliance with the instructed home exercises.    Assessment:   Education: Reviewed home exercise program.  Updated HEP today.  Stretching 3x per day.  Isometrics 3 x per week.  Progress towards functional goals: Patient reports there has not been a significant change in functional abilities. Improved ability to sleep thru the night. Reduced frequency of pain. Reduced intensity of pain. Reduced pain level.  Response to interventions: Patient tolerated therapeutic interventions well and without any adverse events. Tolerated treatment  "session well without any increase in pain.  Justification for continued skilled care: To address remaining functional, objective and subjective deficits to allow the patient to return to full independence with ADLs.    Plan:  Phase II AAROM    Objective:   3/4/25: PROM flexion 105, ER 15, Abd 80     Treatment Performed: (\"NP\" = Not Performed)     Therapeutic Exercise:     30 minutes  Home exercise program instructed and issued on 25  Access Code: [email or web address]  Pulleys: 3 minutes facing wall.  L stretch at the counter top x 10   Standing Shoulder ER AAROM with Dowel  10 reps - 3 second hold  AAROM Wand scaption  x 10 , Wand Abd  x 10   Standing shoulder extension stretch:  x 15  Post capsule/Horiz Add stretchin\" x 10   Supine Shoulder Flexion AAROM with Hands Clasped  2 sets - 10 reps   Submax, low intensity isometrics: ER/IR/FLEX/EXT 5\" x 10 ea       On 25 may progress to: AAROM per patient tolerance - adding abduction, horizontal abduction (maintain subscapularis precautions)  Ball on wall, UE swiss ball mobility -IR/ER  Towel wipes on table - any direction Strengthening  Closed-chain stability - elbow extension with hand on ball performing oscillations  Progress scapular neuromuscular strengthening  Initiate SUB-MAX/50% effort strengthening  Isometric flexion, extension, abduction, ER, IR  Isometric lower trap WEEK 8-10 ROM § AROM per patient tolerance; avoid scapular substitution    Manual Therapy:     10 minutes  Supine Prom ER/ABD/Flexion/Scaption.    Neuromuscular Re-education:      minutes      Modalities:       Vasopneumatic Device       minutes  Electrical Stimulation          minutes  Ultrasound            minutes  Iontophoresis                     minutes  Cold Pack            minutes  Mechanical Traction           minutes    Gait Training:            minutes      Aquatics:            minutes      Therapeutic Activity:      minutes      Self Care Home Management:    minutes    Woodrow " Reposition:          minutes     Education:          minutes    Other:       minutes      Evaluation Complexity: Low:   minutes; Moderate   minutes; Complex   minutes    Re-Evaluation:   minutes

## 2025-03-04 ENCOUNTER — TREATMENT (OUTPATIENT)
Dept: PHYSICAL THERAPY | Facility: CLINIC | Age: 65
End: 2025-03-04
Payer: MEDICARE

## 2025-03-04 ENCOUNTER — OFFICE VISIT (OUTPATIENT)
Dept: ORTHOPEDIC SURGERY | Facility: CLINIC | Age: 65
End: 2025-03-04
Payer: MEDICARE

## 2025-03-04 DIAGNOSIS — Z98.890 STATUS POST ARTHROSCOPY OF LEFT SHOULDER: Primary | ICD-10-CM

## 2025-03-04 PROCEDURE — 1159F MED LIST DOCD IN RCRD: CPT | Performed by: ORTHOPAEDIC SURGERY

## 2025-03-04 PROCEDURE — 97110 THERAPEUTIC EXERCISES: CPT | Mod: GP | Performed by: PHYSICAL THERAPIST

## 2025-03-04 PROCEDURE — 99211 OFF/OP EST MAY X REQ PHY/QHP: CPT | Performed by: ORTHOPAEDIC SURGERY

## 2025-03-04 PROCEDURE — 97140 MANUAL THERAPY 1/> REGIONS: CPT | Mod: GP | Performed by: PHYSICAL THERAPIST

## 2025-03-04 PROCEDURE — 1160F RVW MEDS BY RX/DR IN RCRD: CPT | Performed by: ORTHOPAEDIC SURGERY

## 2025-03-04 PROCEDURE — 1036F TOBACCO NON-USER: CPT | Performed by: ORTHOPAEDIC SURGERY

## 2025-03-05 NOTE — PROGRESS NOTES
65-year-old is seen following left shoulder arthroscopy with rotator cuff repair January 17, 2025.  She is making progress and having less pain.    Passive forward flexion 95 degrees and external rotation 20 degrees.  Mild tenderness.    She is progressing satisfactorily.  Should continue with physical therapy.  Precautions were reviewed.  Follow-up in 2 months with x-rays.

## 2025-03-07 ENCOUNTER — APPOINTMENT (OUTPATIENT)
Dept: PHYSICAL THERAPY | Facility: CLINIC | Age: 65
End: 2025-03-07
Payer: MEDICARE

## 2025-03-11 ENCOUNTER — TREATMENT (OUTPATIENT)
Dept: PHYSICAL THERAPY | Facility: CLINIC | Age: 65
End: 2025-03-11
Payer: MEDICARE

## 2025-03-11 DIAGNOSIS — Z98.890 STATUS POST ARTHROSCOPY OF LEFT SHOULDER: ICD-10-CM

## 2025-03-11 PROCEDURE — 97140 MANUAL THERAPY 1/> REGIONS: CPT | Mod: GP,CQ

## 2025-03-11 PROCEDURE — 97110 THERAPEUTIC EXERCISES: CPT | Mod: GP,CQ

## 2025-03-11 NOTE — PROGRESS NOTES
PHYSICAL THERAPY TREATMENT NOTE    Patient Name:  Nancy Marcus   Patient MRN: [id number]  Date: 3/11/2025  Time Calculation  Start Time: 0830  Stop Time: 0915  Time Calculation (min): 45 min      Problem List Items Addressed This Visit    None  Visit Diagnoses         Codes    Status post arthroscopy of left shoulder     Z98.890          Insurance:  Visit number: 4 of 20V PCY 0 USED NO AUTH NEEDED PAYS AT 65% AFTER DED 2800.00   Insurance Type: Payor: [employer] [place] / Plan: [facility] HMO / Product Type: *No Product type* /   Insurance is changing    General:  Surgery: Left Shoulder Arthroscopic Rotator Cuff Repair - Left, Debridement - Left, and Subacromial Decompression - Left  Date of Last Surgery: 1/17/2025   6 weeks post op = 2/28/25  12 weeks post op = 4/11/25  16 weeks = 5/9/24  Post-Op Days: 32   Referred by: Burton Davis MD Next MD appt:  04/29/25     Precautions:  MASSIVE TEAR MODIFICATION - Full thickness of supraspinatus and infraspinatus, (subcap was intact)  Fall Risk: None    Subjective:   Nancy reports she feels like her condition is improving. Patient reports her L shoulder ROM is getting better. No pain at rest.   Pain (0-10): 2-3/10 pain with exercises    HEP adherence / understanding: patient reports compliance with the instructed home exercises.    Assessment:   Education: Reviewed home exercise program. Stretching 3x per day.  Isometrics 3 x per week.  Progress towards functional goals: Reduced frequency of pain. Reduced intensity of pain. Reduced pain level.  Response to interventions: Patient is progressing with R shoulder ROM with less overall pain. Initiated swiss ball flexion at wall along with scapular stability at wall with elbow extension which was challenging. Patient tolerated therapeutic interventions well and without any adverse events. Tolerated treatment session well without any increase  "in pain.  Justification for continued skilled care: To address remaining functional, objective and subjective deficits to allow the patient to return to full independence with ADLs.    Plan:  Phase II AAROM    Objective:   3/11/25: PROM flexion 135, ER 21, Abd 85  3/4/25: PROM flexion 105, ER 15, Abd 80     Treatment Performed: (\"NP\" = Not Performed)     Therapeutic Exercise:     35 minutes  Home exercise program instructed and issued on 25  Access Code: [email or web address]  Pulleys: 3 minutes facing wall.  L stretch at the counter top x 10   Standing Shoulder ER AAROM with Dowel  2 x 10  - 3 second hold  AAROM Wand scaption  2 x 10 , Wand Abd  2 x 10   Standing shoulder extension stretch:  2 x 10  Post capsule/Horiz Add stretchin\" x 10   Supine Shoulder Flexion AAROM with Hands Clasped  2 x 10   Submax, low intensity isometrics: ER/IR/FLEX/EXT 5\" x 10 ea ( open hand )  Swiss ball flexion at wall x 10 yellow ball  Elbow extension with hand on ball oscillations flex/ext, abd/add and circles 2 x 10 ea      On 25 may progress to: AAROM per patient tolerance - adding abduction, horizontal abduction (maintain subscapularis precautions)  Ball on wall, UE swiss ball mobility -IR/ER  Towel wipes on table - any direction Strengthening  Closed-chain stability - elbow extension with hand on ball performing oscillations  Progress scapular neuromuscular strengthening  Initiate SUB-MAX/50% effort strengthening  Isometric flexion, extension, abduction, ER, IR  Isometric lower trap WEEK 8-10 ROM § AROM per patient tolerance; avoid scapular substitution    Manual Therapy:     10 minutes  Supine Prom ER/ABD/Flexion/Scaption.    Neuromuscular Re-education:      minutes      Modalities:       Vasopneumatic Device       minutes  Electrical Stimulation          minutes  Ultrasound            minutes  Iontophoresis                     minutes  Cold Pack            minutes  Mechanical Traction           minutes    Gait Training:      "       minutes      Aquatics:            minutes      Therapeutic Activity:      minutes      Self Care Home Management:    minutes    Canalith Reposition:          minutes     Education:          minutes    Other:       minutes      Evaluation Complexity: Low:   minutes; Moderate   minutes; Complex   minutes    Re-Evaluation:   minutes

## 2025-03-18 ENCOUNTER — TREATMENT (OUTPATIENT)
Dept: PHYSICAL THERAPY | Facility: CLINIC | Age: 65
End: 2025-03-18
Payer: MEDICARE

## 2025-03-18 DIAGNOSIS — Z98.890 STATUS POST ARTHROSCOPY OF LEFT SHOULDER: ICD-10-CM

## 2025-03-18 PROCEDURE — 97140 MANUAL THERAPY 1/> REGIONS: CPT | Mod: GP,CQ

## 2025-03-18 PROCEDURE — 97110 THERAPEUTIC EXERCISES: CPT | Mod: GP,CQ

## 2025-03-18 NOTE — PROGRESS NOTES
PHYSICAL THERAPY TREATMENT NOTE    Patient Name:  Nancy Marcus   Patient MRN: [id number]  Date: 3/18/2025    Time Calculation  Start Time: 0835  Stop Time: 0920  Time Calculation (min): 45 min      Problem List Items Addressed This Visit    None  Visit Diagnoses         Codes    Status post arthroscopy of left shoulder     Z98.890          Insurance:  Visit number: 5 of 20V PCY 0 USED NO AUTH NEEDED PAYS AT 65% AFTER DED 2800.00   Insurance Type: Payor: [facility] / Plan: [facility] HMO / Product Type: *No Product type* /   Insurance is changing    General:  Surgery: Left Shoulder Arthroscopic Rotator Cuff Repair - Left, Debridement - Left, and Subacromial Decompression - Left  Date of Last Surgery: 1/17/2025   6 weeks post op = 2/28/25  12 weeks post op = 4/11/25  16 weeks = 5/9/24  Post-Op Days: 32   Referred by: Burton Davis MD Next MD appt:  04/29/25     Precautions:  MASSIVE TEAR MODIFICATION - Full thickness of supraspinatus and infraspinatus, (subcap was intact)  Fall Risk: None    Subjective:   Nancy reports she feels like her condition is improving. Patient reports the mornings with the L shoulder are the worst, once she loosens up it is better. Continues to have no pain at rest.   Pain (0-10): 1-2/10 pain with exercises    HEP adherence / understanding: patient reports compliance with the instructed home exercises.    Assessment:   Education: Reviewed home exercise program. Stretching 3x per day.  Isometrics 3 x per week.  Progress towards functional goals: Reduced frequency of pain. Reduced intensity of pain. Reduced pain level.  Response to interventions: Patient presented to session with decreasing pain levels during exercise. Added isometric shoulder abd with good tolerance. Pendulums reduce increased irritability during wall oscillations at the wall with ball. Making great progress with PROM at this time  "will be 9 weeks on 25. Patient tolerated therapeutic interventions well and without any adverse events. Tolerated treatment session well without any increase in pain.  Justification for continued skilled care: To address remaining functional, objective and subjective deficits to allow the patient to return to full independence with ADLs.    Plan:  Phase II AAROM    Objective:   3/18/25: PROM flexion 138, ER 28, Abd 95  3/11/25: PROM flexion 135, ER 21, Abd 85  3/4/25: PROM flexion 105, ER 15, Abd 80     Treatment Performed: (\"NP\" = Not Performed)     Therapeutic Exercise:     35 minutes  Home exercise program instructed and issued on 25  Access Code: [email or web address]  Pulleys: 3 minutes facing wall.  L stretch at the counter top x 10   Standing Shoulder ER AAROM with Dowel  2 x 10  - 3 second hold  AAROM Wand scaption  2 x 10 , Wand Abd  2 x 10   Standing shoulder extension stretch:  2 x 10  Post capsule/Horiz Add stretchin\" x 10   Supine Shoulder Flexion AAROM with Hands Clasped  2 x 10   Submax, low intensity isometrics: ER/IR/FLEX/EXT/ABD 5\" x 10 ea ( open hand )  Swiss ball flexion at wall x 10 yellow ball  Elbow extension with hand on ball oscillations flex/ext, abd/add and circles 2 x 10 ea  Towel wipes seated at plinth 2 x 10    On 25 may progress to: AAROM per patient tolerance - adding abduction, horizontal abduction (maintain subscapularis precautions)  Ball on wall, UE swiss ball mobility -IR/ER  Towel wipes on table - any direction Strengthening  Closed-chain stability - elbow extension with hand on ball performing oscillations  Progress scapular neuromuscular strengthening  Initiate SUB-MAX/50% effort strengthening  Isometric flexion, extension, abduction, ER, IR  Isometric lower trap WEEK 8-10 ROM § AROM per patient tolerance; avoid scapular substitution    Manual Therapy:     10 minutes  Supine Prom ER/ABD/Flexion/Scaption.    Neuromuscular Re-education:      minutes      Modalities: "       Vasopneumatic Device       minutes  Electrical Stimulation          minutes  Ultrasound            minutes  Iontophoresis                     minutes  Cold Pack            minutes  Mechanical Traction           minutes    Gait Training:            minutes      Aquatics:            minutes      Therapeutic Activity:      minutes      Self Care Home Management:    minutes    Canalith Reposition:          minutes     Education:          minutes    Other:       minutes      Evaluation Complexity: Low:   minutes; Moderate   minutes; Complex   minutes    Re-Evaluation:   minutes

## 2025-03-20 ENCOUNTER — APPOINTMENT (OUTPATIENT)
Dept: PRIMARY CARE | Facility: CLINIC | Age: 65
End: 2025-03-20
Payer: COMMERCIAL

## 2025-03-20 VITALS
HEIGHT: 62 IN | HEART RATE: 88 BPM | OXYGEN SATURATION: 96 % | TEMPERATURE: 99.4 F | SYSTOLIC BLOOD PRESSURE: 134 MMHG | DIASTOLIC BLOOD PRESSURE: 84 MMHG | WEIGHT: 204.8 LBS | BODY MASS INDEX: 37.69 KG/M2

## 2025-03-20 DIAGNOSIS — J01.90 ACUTE NON-RECURRENT SINUSITIS, UNSPECIFIED LOCATION: ICD-10-CM

## 2025-03-20 DIAGNOSIS — I10 BENIGN ESSENTIAL HYPERTENSION: ICD-10-CM

## 2025-03-20 DIAGNOSIS — E78.5 HYPERLIPIDEMIA, UNSPECIFIED HYPERLIPIDEMIA TYPE: ICD-10-CM

## 2025-03-20 DIAGNOSIS — Z12.11 ENCOUNTER FOR SCREENING FOR MALIGNANT NEOPLASM OF COLON: ICD-10-CM

## 2025-03-20 DIAGNOSIS — F32.A DEPRESSION, UNSPECIFIED DEPRESSION TYPE: ICD-10-CM

## 2025-03-20 DIAGNOSIS — M15.9 GENERALIZED OSTEOARTHRITIS OF MULTIPLE SITES: ICD-10-CM

## 2025-03-20 DIAGNOSIS — E03.9 HYPOTHYROIDISM, UNSPECIFIED TYPE: ICD-10-CM

## 2025-03-20 DIAGNOSIS — Z12.31 SCREENING MAMMOGRAM FOR BREAST CANCER: ICD-10-CM

## 2025-03-20 DIAGNOSIS — Z00.00 MEDICARE WELCOME EXAM: Primary | ICD-10-CM

## 2025-03-20 PROBLEM — G47.33 OBSTRUCTIVE SLEEP APNEA SYNDROME: Status: ACTIVE | Noted: 2025-03-20

## 2025-03-20 PROBLEM — M25.512: Status: ACTIVE | Noted: 2024-09-22

## 2025-03-20 PROBLEM — E66.9 OBESITY: Status: ACTIVE | Noted: 2025-03-20

## 2025-03-20 PROCEDURE — 99497 ADVNCD CARE PLAN 30 MIN: CPT | Performed by: FAMILY MEDICINE

## 2025-03-20 PROCEDURE — 1036F TOBACCO NON-USER: CPT | Performed by: FAMILY MEDICINE

## 2025-03-20 PROCEDURE — 90677 PCV20 VACCINE IM: CPT | Performed by: FAMILY MEDICINE

## 2025-03-20 PROCEDURE — 99214 OFFICE O/P EST MOD 30 MIN: CPT | Performed by: FAMILY MEDICINE

## 2025-03-20 PROCEDURE — 1159F MED LIST DOCD IN RCRD: CPT | Performed by: FAMILY MEDICINE

## 2025-03-20 PROCEDURE — 1160F RVW MEDS BY RX/DR IN RCRD: CPT | Performed by: FAMILY MEDICINE

## 2025-03-20 PROCEDURE — 3008F BODY MASS INDEX DOCD: CPT | Performed by: FAMILY MEDICINE

## 2025-03-20 PROCEDURE — 3079F DIAST BP 80-89 MM HG: CPT | Performed by: FAMILY MEDICINE

## 2025-03-20 PROCEDURE — 1126F AMNT PAIN NOTED NONE PRSNT: CPT | Performed by: FAMILY MEDICINE

## 2025-03-20 PROCEDURE — 3075F SYST BP GE 130 - 139MM HG: CPT | Performed by: FAMILY MEDICINE

## 2025-03-20 PROCEDURE — G0438 PPPS, INITIAL VISIT: HCPCS | Performed by: FAMILY MEDICINE

## 2025-03-20 PROCEDURE — 1170F FXNL STATUS ASSESSED: CPT | Performed by: FAMILY MEDICINE

## 2025-03-20 PROCEDURE — G0009 ADMIN PNEUMOCOCCAL VACCINE: HCPCS | Performed by: FAMILY MEDICINE

## 2025-03-20 RX ORDER — ROSUVASTATIN CALCIUM 10 MG/1
10 TABLET, COATED ORAL DAILY
Qty: 100 TABLET | Refills: 3 | Status: SHIPPED | OUTPATIENT
Start: 2025-03-20 | End: 2026-04-24

## 2025-03-20 RX ORDER — LEVOTHYROXINE SODIUM 125 UG/1
125 TABLET ORAL DAILY
Qty: 90 TABLET | Refills: 3 | Status: SHIPPED | OUTPATIENT
Start: 2025-03-20

## 2025-03-20 RX ORDER — CYCLOBENZAPRINE HCL 10 MG
10 TABLET ORAL 3 TIMES DAILY PRN
Qty: 30 TABLET | Refills: 1 | Status: SHIPPED | OUTPATIENT
Start: 2025-03-20

## 2025-03-20 RX ORDER — AMOXICILLIN AND CLAVULANATE POTASSIUM 500; 125 MG/1; MG/1
500 TABLET, FILM COATED ORAL 2 TIMES DAILY
Qty: 20 TABLET | Refills: 0 | Status: SHIPPED | OUTPATIENT
Start: 2025-03-20 | End: 2025-03-30

## 2025-03-20 RX ORDER — SERTRALINE HYDROCHLORIDE 100 MG/1
100 TABLET, FILM COATED ORAL DAILY
Qty: 90 TABLET | Refills: 3 | Status: SHIPPED | OUTPATIENT
Start: 2025-03-20

## 2025-03-20 ASSESSMENT — ACTIVITIES OF DAILY LIVING (ADL)
JUDGMENT_ADEQUATE_SAFELY_COMPLETE_DAILY_ACTIVITIES: YES
DRESSING YOURSELF: INDEPENDENT
FEEDING YOURSELF: INDEPENDENT
TAKING MEDICATION: INDEPENDENT
MANAGING FINANCES: INDEPENDENT
EATING: INDEPENDENT
GROCERY SHOPPING: INDEPENDENT
DOING HOUSEWORK: INDEPENDENT
USING TRANSPORTATION: INDEPENDENT
WALKS IN HOME: INDEPENDENT
PREPARING MEALS: INDEPENDENT
NEEDS ASSISTANCE WITH FOOD: INDEPENDENT
TOILETING: INDEPENDENT
ADEQUATE_TO_COMPLETE_ADL: YES
STIL DRIVING: YES
GROOMING: INDEPENDENT
USING TELEPHONE: INDEPENDENT
PATIENT'S MEMORY ADEQUATE TO SAFELY COMPLETE DAILY ACTIVITIES?: YES
BATHING: INDEPENDENT

## 2025-03-20 ASSESSMENT — ENCOUNTER SYMPTOMS
RESPIRATORY NEGATIVE: 1
CARDIOVASCULAR NEGATIVE: 1
NEUROLOGICAL NEGATIVE: 1
ARTHRALGIAS: 1
GASTROINTESTINAL NEGATIVE: 1
CONSTITUTIONAL NEGATIVE: 1
ENDOCRINE COMMENTS: THYROID DISEASE
ENDOCRINE NEGATIVE: 1
PSYCHIATRIC NEGATIVE: 1
LOSS OF SENSATION IN FEET: 0
OCCASIONAL FEELINGS OF UNSTEADINESS: 0
DEPRESSION: 0

## 2025-03-20 ASSESSMENT — GERIATRIC MINI NUTRITIONAL ASSESSMENT (MNA)
B WEIGHT LOSS DURING THE LAST 3 MONTHS: NO WEIGHT LOSS
C GENERAL MOBILITY: GOES OUT
D HAS SUFFERED PSYCHOLOGICAL STRESS OR ACUTE DISEASE IN THE PAST 3 MONTHS?: NO
E NEUROPSYCHOLOGICAL PROBLEMS: NO PSYCHOLOGICAL PROBLEMS
A HAS FOOD INTAKE DECLINED OVER THE PAST 3 MONTHS DUE TO LOSS OF APPETITE, DIGESTIVE PROBLEMS, CHEWING OR SWALLOWING DIFFICULTIES?: NO DECREASE IN FOOD INTAKE

## 2025-03-20 ASSESSMENT — ANXIETY QUESTIONNAIRES
1. FEELING NERVOUS, ANXIOUS, OR ON EDGE: NOT AT ALL
2. NOT BEING ABLE TO STOP OR CONTROL WORRYING: NOT AT ALL
4. TROUBLE RELAXING: NOT AT ALL
7. FEELING AFRAID AS IF SOMETHING AWFUL MIGHT HAPPEN: NOT AT ALL
GAD7 TOTAL SCORE: 0
6. BECOMING EASILY ANNOYED OR IRRITABLE: NOT AT ALL
3. WORRYING TOO MUCH ABOUT DIFFERENT THINGS: NOT AT ALL
5. BEING SO RESTLESS THAT IT IS HARD TO SIT STILL: NOT AT ALL

## 2025-03-20 ASSESSMENT — PATIENT HEALTH QUESTIONNAIRE - PHQ9
2. FEELING DOWN, DEPRESSED OR HOPELESS: NOT AT ALL
SUM OF ALL RESPONSES TO PHQ9 QUESTIONS 1 AND 2: 0
1. LITTLE INTEREST OR PLEASURE IN DOING THINGS: NOT AT ALL

## 2025-03-20 ASSESSMENT — PAIN SCALES - GENERAL: PAINLEVEL_OUTOF10: 0-NO PAIN

## 2025-03-20 NOTE — ACP (ADVANCE CARE PLANNING)
Confirming Previous Code Status:   Advance Care Planning Note     Discussion Date: 03/20/25   Discussion Participants: patient    The patient wishes to discuss Advance Care Planning today and the following is a brief summary of our discussion.     Patient has capacity to make their own medical decisions: Yes  Health Care Agent/Surrogate Decision Maker documented in chart: Yes    Documents on file and valid:  Advance Directive/Living Will: Yes   Health Care Power of : Yes  Other: none    Communication of Medical Status/Prognosis:   yes     Communication of Treatment Goals/Options:   yes     Treatment Decisions  Goals of Care: survival is paramount regardless of prognosis, treatment outcome, or burden   yes  Follow Up Plan  no  Team Members  myself  Time Statement: Total face to face time spent on advance care planning was 16 minutes with 16 minutes spent in counseling, including the explanation.    Jey Badillo,   3/20/2025 9:42 AM

## 2025-03-20 NOTE — PROGRESS NOTES
"Subjective   Patient ID: Nancy Marcus is a 65 y.o. female who presents for Annual Exam (Assessment welcome to medicare wellness fasting bw).    HPI     Review of Systems   Constitutional: Negative.    HENT: Negative.     Respiratory: Negative.     Cardiovascular: Negative.    Gastrointestinal: Negative.    Endocrine: Negative.         Thyroid disease   Genitourinary: Negative.    Musculoskeletal:  Positive for arthralgias.   Neurological: Negative.    Psychiatric/Behavioral: Negative.         Objective   /84 (BP Location: Left arm)   Pulse 88   Temp 37.4 °C (99.4 °F) (Oral)   Ht 1.575 m (5' 2\")   Wt 92.9 kg (204 lb 12.8 oz)   SpO2 96%   BMI 37.46 kg/m²     Physical Exam  Vitals and nursing note reviewed.   Constitutional:       Appearance: Normal appearance.   HENT:      Right Ear: Tympanic membrane normal.      Left Ear: Tympanic membrane normal.      Mouth/Throat:      Pharynx: Oropharynx is clear.   Cardiovascular:      Rate and Rhythm: Normal rate and regular rhythm.      Pulses: Normal pulses.      Heart sounds: Normal heart sounds.   Pulmonary:      Breath sounds: Normal breath sounds.   Abdominal:      Palpations: Abdomen is soft.   Musculoskeletal:         General: Normal range of motion.      Comments: Recent rotator cuff repair left shoulder Dr. Davis   Neurological:      General: No focal deficit present.      Mental Status: She is alert and oriented to person, place, and time.   Psychiatric:         Mood and Affect: Mood normal.         Behavior: Behavior normal.         Assessment/Plan patient seen here for a welcome to Medicare visit.  We reviewed her questionnaire she is agreeable to her responses.  We did discuss advanced directives.  She has no significant difficulty with depression or anxiety.  We are drawing her lab work here today she is getting her pneumonia 20 vaccine and we did refer her for mammography and a colonoscopy.  Will see her back in a year  Problem List Items Addressed " This Visit             ICD-10-CM    Benign essential hypertension I10    Relevant Orders    CBC and Auto Differential    Comprehensive Metabolic Panel    Generalized osteoarthritis of multiple sites M15.9    Relevant Medications    cyclobenzaprine (Flexeril) 10 mg tablet    Hypothyroidism E03.9    Relevant Medications    levothyroxine (Synthroid, Levoxyl) 125 mcg tablet    Other Relevant Orders    TSH with reflex to Free T4 if abnormal     Other Visit Diagnoses         Codes    Medicare welcome exam    -  Primary Z00.00    Hyperlipidemia, unspecified hyperlipidemia type     E78.5    Relevant Medications    rosuvastatin (Crestor) 10 mg tablet    Other Relevant Orders    Lipid Panel    Depression, unspecified depression type     F32.A    Relevant Medications    sertraline (Zoloft) 100 mg tablet    Encounter for screening for malignant neoplasm of colon     Z12.11    Relevant Orders    Colonoscopy Screening; Average Risk Patient    BMI 37.0-37.9, adult     Z68.37    Screening mammogram for breast cancer     Z12.31    Relevant Orders    BI mammo bilateral screening tomosynthesis    Acute non-recurrent sinusitis, unspecified location     J01.90    Relevant Medications    amoxicillin-pot clavulanate (Augmentin) 500-125 mg tablet

## 2025-03-21 LAB
ALBUMIN SERPL-MCNC: 4.5 G/DL (ref 3.6–5.1)
ALP SERPL-CCNC: 84 U/L (ref 37–153)
ALT SERPL-CCNC: 18 U/L (ref 6–29)
ANION GAP SERPL CALCULATED.4IONS-SCNC: 11 MMOL/L (CALC) (ref 7–17)
AST SERPL-CCNC: 20 U/L (ref 10–35)
BASOPHILS # BLD AUTO: 39 CELLS/UL (ref 0–200)
BASOPHILS NFR BLD AUTO: 0.4 %
BILIRUB SERPL-MCNC: 0.5 MG/DL (ref 0.2–1.2)
BUN SERPL-MCNC: 13 MG/DL (ref 7–25)
CALCIUM SERPL-MCNC: 9.7 MG/DL (ref 8.6–10.4)
CHLORIDE SERPL-SCNC: 104 MMOL/L (ref 98–110)
CHOLEST SERPL-MCNC: 138 MG/DL
CHOLEST/HDLC SERPL: 2.6 (CALC)
CO2 SERPL-SCNC: 25 MMOL/L (ref 20–32)
CREAT SERPL-MCNC: 0.57 MG/DL (ref 0.5–1.05)
EGFRCR SERPLBLD CKD-EPI 2021: 101 ML/MIN/1.73M2
EOSINOPHIL # BLD AUTO: 243 CELLS/UL (ref 15–500)
EOSINOPHIL NFR BLD AUTO: 2.5 %
ERYTHROCYTE [DISTWIDTH] IN BLOOD BY AUTOMATED COUNT: 12.8 % (ref 11–15)
GLUCOSE SERPL-MCNC: 104 MG/DL (ref 65–99)
HCT VFR BLD AUTO: 44.2 % (ref 35–45)
HDLC SERPL-MCNC: 53 MG/DL
HGB BLD-MCNC: 14.5 G/DL (ref 11.7–15.5)
LDLC SERPL CALC-MCNC: 66 MG/DL (CALC)
LYMPHOCYTES # BLD AUTO: 1164 CELLS/UL (ref 850–3900)
LYMPHOCYTES NFR BLD AUTO: 12 %
MCH RBC QN AUTO: 29.7 PG (ref 27–33)
MCHC RBC AUTO-ENTMCNC: 32.8 G/DL (ref 32–36)
MCV RBC AUTO: 90.4 FL (ref 80–100)
MONOCYTES # BLD AUTO: 475 CELLS/UL (ref 200–950)
MONOCYTES NFR BLD AUTO: 4.9 %
NEUTROPHILS # BLD AUTO: 7779 CELLS/UL (ref 1500–7800)
NEUTROPHILS NFR BLD AUTO: 80.2 %
NONHDLC SERPL-MCNC: 85 MG/DL (CALC)
PLATELET # BLD AUTO: 196 THOUSAND/UL (ref 140–400)
PMV BLD REES-ECKER: 11.1 FL (ref 7.5–12.5)
POTASSIUM SERPL-SCNC: 4.5 MMOL/L (ref 3.5–5.3)
PROT SERPL-MCNC: 7 G/DL (ref 6.1–8.1)
RBC # BLD AUTO: 4.89 MILLION/UL (ref 3.8–5.1)
SODIUM SERPL-SCNC: 140 MMOL/L (ref 135–146)
TRIGL SERPL-MCNC: 107 MG/DL
TSH SERPL-ACNC: 1.98 MIU/L (ref 0.4–4.5)
WBC # BLD AUTO: 9.7 THOUSAND/UL (ref 3.8–10.8)

## 2025-03-25 ENCOUNTER — TREATMENT (OUTPATIENT)
Dept: PHYSICAL THERAPY | Facility: CLINIC | Age: 65
End: 2025-03-25
Payer: MEDICARE

## 2025-03-25 DIAGNOSIS — Z98.890 STATUS POST ARTHROSCOPY OF LEFT SHOULDER: Primary | ICD-10-CM

## 2025-03-25 PROCEDURE — 97140 MANUAL THERAPY 1/> REGIONS: CPT | Mod: GP | Performed by: PHYSICAL THERAPIST

## 2025-03-25 PROCEDURE — 97110 THERAPEUTIC EXERCISES: CPT | Mod: GP | Performed by: PHYSICAL THERAPIST

## 2025-03-25 NOTE — PROGRESS NOTES
PHYSICAL THERAPY TREATMENT NOTE    Patient Name:  Nacny Marcus   Patient MRN: [id number]  Date: 3/25/2025    Time Calculation  Start Time: 0830  Stop Time: 0915  Time Calculation (min): 45 min      Problem List Items Addressed This Visit    None  Visit Diagnoses         Codes    Status post arthroscopy of left shoulder    -  Primary Z98.890            Insurance:  Visit number: 6 of 20V PCY 0 USED NO AUTH NEEDED PAYS AT 65% AFTER DED 2800.00   Insurance Type: Payor: MEDICAL [facility] / Plan: [facility] Insurance is changing    General:  Surgery: Left Shoulder Arthroscopic Rotator Cuff Repair - Left, Debridement - Left, and Subacromial Decompression - Left  Date of Last Surgery: 1/17/2025   6 weeks post op = 2/28/25  12 weeks post op = 4/11/25  16 weeks = 5/9/24  Post-Op Days: 32   Referred by: Burton Davis MD Next MD appt:  04/29/25     Precautions:  MASSIVE TEAR MODIFICATION - Full thickness of supraspinatus and infraspinatus, (subcap was intact)  Fall Risk: None    Subjective:   Nancy reports she feels like her condition is improving. Patient reports Improved ability to sleep thru the night. Improved reaching ability.   Pain (0-10): 1 /10 when doing ball on wall.   HEP adherence / understanding: patient reports compliance with the instructed home exercises.    Assessment:  Excellent progress from massive repair.  AROM and PROM are progressing well.  Education: Reviewed home exercise program.  Progress towards functional goals: Improved reaching ability. Reduced frequency of pain. Reduced intensity of pain. Reduced pain level.  Response to interventions: Patient was appropriately fatigued with no complaints.  Justification for continued skilled care: To address remaining functional, objective and subjective deficits to allow the patient to return to full independence with ADLs.    Plan:  Manual therapy to improve joint  "mobility. Exercises to gradually increase flexibility and range of motion of the L shoulder.  Isotmetrics and AROM.      Objective:   3/25/25: AROM: flexion:125,  ER with arm at side: 45  3/18/25: PROM flexion 138, ER 28, Abd 95    Treatment Performed: (\"NP\" = Not Performed)     Therapeutic Exercise:     30 minutes  Home exercise program instructed and issued on 25  Access Code: [email or web address]  Pulleys: 3 minutes facing wall.  Roll Swiss Ball up wall: 3 x 10   Wand Extension x 20   Bent over T - (horiz Abd) x 10 ea with palm down/thumb up  Bent over Y - x 10 ea with palm down/thumb up  Standing Shoulder ER AAROM with Dowel  2 x 10  - 3 second hold  S/l'ing Abduction AROM x 20   S/l'ing: flexion AROM x 20  S/l ER AROM x 20   S/l ABC in 90 flexion x 1    **ACTIVITIES BELOW WERE NOT PERFORMED**   AAROM Wand scaption  2 x 10 , Wand Abd  2 x 10   Standing shoulder extension stretch:  2 x 10  Post capsule/Horiz Add stretchin\" x 10   Supine Shoulder Flexion AAROM with Hands Clasped  2 x 10   Swiss ball flexion at wall x 10 yellow ball  Elbow extension with hand on ball oscillations flex/ext, abd/add and circles 2 x 10 ea  Towel wipes seated at plinth 2 x 10      Manual Therapy:     15 minutes  Supine Prom ER/ABD/Flexion/Scaption.  Seated STM to the UT, infraspinatus, levator.    Neuromuscular Re-education:      minutes      Modalities:       Vasopneumatic Device       minutes  Electrical Stimulation          minutes  Ultrasound            minutes  Iontophoresis                     minutes  Cold Pack            minutes  Mechanical Traction           minutes    Gait Training:            minutes      Aquatics:            minutes      Therapeutic Activity:      minutes      Self Care Home Management:    minutes    Canalith Reposition:          minutes     Education:          minutes    Other:       minutes      Evaluation Complexity: Low:   minutes; Moderate   minutes; Complex   minutes    Re-Evaluation:   minutes         "

## 2025-03-31 DIAGNOSIS — J01.90 ACUTE NON-RECURRENT SINUSITIS, UNSPECIFIED LOCATION: Primary | ICD-10-CM

## 2025-03-31 RX ORDER — CEFDINIR 300 MG/1
300 CAPSULE ORAL 2 TIMES DAILY
Qty: 20 CAPSULE | Refills: 0 | Status: SHIPPED | OUTPATIENT
Start: 2025-03-31 | End: 2025-04-10

## 2025-04-01 ENCOUNTER — TREATMENT (OUTPATIENT)
Dept: PHYSICAL THERAPY | Facility: CLINIC | Age: 65
End: 2025-04-01
Payer: MEDICARE

## 2025-04-01 DIAGNOSIS — Z98.890 STATUS POST ARTHROSCOPY OF LEFT SHOULDER: Primary | ICD-10-CM

## 2025-04-01 PROCEDURE — 97140 MANUAL THERAPY 1/> REGIONS: CPT | Mod: GP | Performed by: PHYSICAL THERAPIST

## 2025-04-01 PROCEDURE — 97110 THERAPEUTIC EXERCISES: CPT | Mod: GP | Performed by: PHYSICAL THERAPIST

## 2025-04-01 NOTE — PROGRESS NOTES
PHYSICAL THERAPY TREATMENT NOTE    Patient Name:  Nancy Marcus   Patient MRN: [id number]  Date: 4/1/2025    Time Calculation  Start Time: 0832  Stop Time: 0915  Time Calculation (min): 43 min      Problem List Items Addressed This Visit             ICD-10-CM    Status post arthroscopy of left shoulder - Primary Z98.890    Relevant Orders    Follow Up In Physical Therapy     Insurance:  Visit number: 7 of 20V PCY 0 USED NO AUTH NEEDED PAYS AT 65% AFTER DED 2800.00   Insurance Type: Payor: [employer] [facility] / Plan: [employer] [facility] Insurance is changing    General:  Surgery: Left Shoulder Arthroscopic Rotator Cuff Repair - Left, Debridement - Left, and Subacromial Decompression - Left  Date of Last Surgery: 1/17/2025   6 weeks post op = 2/28/25  12 weeks post op = 4/11/25  16 weeks = 5/9/24  Post-Op Days: 32   Referred by: Burton Davis MD Next MD appt:  04/29/25     Precautions:  MASSIVE TEAR MODIFICATION - Full thickness of supraspinatus and infraspinatus, (subcap was intact)  Fall Risk: None    Subjective:   Nancy reports she feels like her condition is improving.    Pain (0-10): 0    HEP adherence / understanding: patient reports compliance with the instructed home exercises.    Assessment:   Education: Reviewed home exercise program.   Advised against lifting/pushing/pulling/carrying at this time.  Progress towards functional goals:  Making excellent progress in terms of AROM, PROM, pain levels.  Response to interventions: Patient tolerated therapeutic interventions well and without any adverse events.  Justification for continued skilled care: To address remaining functional, objective and subjective deficits to allow the patient to return to full independence with ADLs.    Plan:  Exercises targeting surrounding musculature to strengthen and improve function. Manual therapy to improve joint mobility. Exercises to gradually  "increase flexibility and range of motion of the L shoulder.    Objective:   : start of session prior to exercise: AROM flexion 120, ER 45  3/25/25: AROM: flexion:125,  ER with arm at side: 45  3/18/25: PROM flexion 138, ER 28, Abd 95    Treatment Performed: (\"NP\" = Not Performed)     Therapeutic Exercise:     35 minutes  Home exercise program instructed and issued on 25  Access Code: [email or web address]  Pulleys: 3 minutes facing away wall.  OH 3 x 10   Roll Swiss Ball up wall: 3 x 10   OH Press: 3 x 10   Wand Extension 3 x 10   Bent over Extension: x 15   Bent over T - (horiz Abd) x 15 ea with palm down/thumb up  Bent over Y - x 15 ea with palm down/thumb up  Standing Shoulder ER AAROM with Dowel  2 x 10  - 3 second hold  S/l'ing Abduction AROM x 20   S/l'ing: flexion AROM x 20  S/l ER AROM x 20   S/l ABC in 90 flexion x 1    **ACTIVITIES BELOW WERE NOT PERFORMED**   AAROM Wand scaption  2 x 10 , Wand Abd  2 x 10   Standing shoulder extension stretch:  2 x 10  Post capsule/Horiz Add stretchin\" x 10   Supine Shoulder Flexion AAROM with Hands Clasped  2 x 10   Swiss ball flexion at wall x 10 yellow ball  Elbow extension with hand on ball oscillations flex/ext, abd/add and circles 2 x 10 ea  Towel wipes seated at plinth 2 x 10      Manual Therapy:     10 minutes  Supine Prom ER/ABD/Flexion/Scaption.  Seated STM to the UT, infraspinatus, levator.    Neuromuscular Re-education:      minutes      Modalities:       Vasopneumatic Device       minutes  Electrical Stimulation          minutes  Ultrasound            minutes  Iontophoresis                     minutes  Cold Pack            minutes  Mechanical Traction           minutes    Gait Training:            minutes      Aquatics:            minutes      Therapeutic Activity:      minutes      Self Care Home Management:    minutes    Canalith Reposition:          minutes     Education:          minutes    Other:       minutes      Evaluation Complexity: Low:   minutes; " Moderate   minutes; Complex   minutes    Re-Evaluation:   minutes

## 2025-04-08 ENCOUNTER — TREATMENT (OUTPATIENT)
Dept: PHYSICAL THERAPY | Facility: CLINIC | Age: 65
End: 2025-04-08
Payer: MEDICARE

## 2025-04-08 DIAGNOSIS — Z98.890 STATUS POST ARTHROSCOPY OF LEFT SHOULDER: ICD-10-CM

## 2025-04-08 PROCEDURE — 97140 MANUAL THERAPY 1/> REGIONS: CPT | Mod: GP,CQ

## 2025-04-08 PROCEDURE — 97110 THERAPEUTIC EXERCISES: CPT | Mod: GP,CQ

## 2025-04-08 NOTE — PROGRESS NOTES
PHYSICAL THERAPY TREATMENT NOTE    Patient Name:  Nancy Marcus   Patient MRN: [id number]  Date: 4/8/2025    Time Calculation  Start Time: 0830  Stop Time: 0908  Time Calculation (min): 38 min      Problem List Items Addressed This Visit             ICD-10-CM    Status post arthroscopy of left shoulder Z98.890       Insurance:  Visit number: 8 of 20V PCY 0 USED NO AUTH NEEDED PAYS AT 65% AFTER DED 2800.00   Insurance Type: Payor: [employer] [place] / Plan: [facility] Insurance is changing    General:  Surgery: Left Shoulder Arthroscopic Rotator Cuff Repair - Left, Debridement - Left, and Subacromial Decompression - Left  Date of Last Surgery: 1/17/2025   6 weeks post op = 2/28/25  12 weeks post op = 4/11/25  16 weeks = 5/9/24  Post-Op Days: 32   Referred by: Burton Davis MD Next MD appt:  04/29/25     Precautions:  MASSIVE TEAR MODIFICATION - Full thickness of supraspinatus and infraspinatus, (subcap was intact)  Fall Risk: None    Subjective:   Nancy reports she feels like her condition is improving. Denies pain in the L shoulder.   Pain (0-10): 0    HEP adherence / understanding: patient reports compliance with the instructed home exercises.    Assessment:   Education: Reviewed home exercise program.  Progress towards functional goals:  Increased L shoulder flexion AROM this date..  Response to interventions: Tactile cues given for proper plane of motion for side lying ER which is challenging. Fatigues with side lying alphabet. Patient tolerated therapeutic interventions well and without any adverse events.  Justification for continued skilled care: To address remaining functional, objective and subjective deficits to allow the patient to return to full independence with ADLs.    Plan:  Exercises targeting surrounding musculature to strengthen and improve function. Manual therapy to improve joint mobility. Exercises to  "gradually increase flexibility and range of motion of the L shoulder.    Objective:   : AROM flexion 131, ER 45  : start of session prior to exercise: AROM flexion 120, ER 45  3/25/25: AROM: flexion:125,  ER with arm at side: 45  3/18/25: PROM flexion 138, ER 28, Abd 95    Treatment Performed: (\"NP\" = Not Performed)     Therapeutic Exercise:     28 minutes  Home exercise program instructed and issued on 25  Access Code: [email or web address]  Pulleys: 3 minutes facing away wall.  OH 3 x 10   Roll Swiss Ball up wall: 3 x 10   OH Press: 3 x 10   Wand Extension 3 x 10   Bent over Extension: x 15   Bent over T - (horiz Abd) x 15 ea with palm down/thumb up  Bent over Y - x 15 ea with palm down/thumb up  Standing Shoulder ER AAROM with Dowel  2 x 10  - 3 second hold  S/l'ing Abduction AROM x 20   S/l'ing: flexion AROM x 20  S/l ER AROM x 20   S/l ABC in 90 flexion x 1    **ACTIVITIES BELOW WERE NOT PERFORMED**   AAROM Wand scaption  2 x 10 , Wand Abd  2 x 10   Standing shoulder extension stretch:  2 x 10  Post capsule/Horiz Add stretchin\" x 10   Supine Shoulder Flexion AAROM with Hands Clasped  2 x 10   Swiss ball flexion at wall x 10 yellow ball  Elbow extension with hand on ball oscillations flex/ext, abd/add and circles 2 x 10 ea  Towel wipes seated at plinth 2 x 10      Manual Therapy:     10 minutes  Supine Prom ER/ABD/Flexion/Scaption.  Seated STM to the UT, infraspinatus, levator.    Neuromuscular Re-education:      minutes      Modalities:       Vasopneumatic Device       minutes  Electrical Stimulation          minutes  Ultrasound            minutes  Iontophoresis                     minutes  Cold Pack            minutes  Mechanical Traction           minutes    Gait Training:            minutes      Aquatics:            minutes      Therapeutic Activity:      minutes      Self Care Home Management:    minutes    Canalith Reposition:          minutes     Education:          minutes    Other:       " minutes      Evaluation Complexity: Low:   minutes; Moderate   minutes; Complex   minutes    Re-Evaluation:   minutes

## 2025-04-14 NOTE — PROGRESS NOTES
PHYSICAL THERAPY TREATMENT NOTE    Patient Name:  Nancy Marcus   Patient MRN: [id number]  Date: 4/15/2025    Time Calculation  Start Time: 0830  Stop Time: 0915  Time Calculation (min): 45 min      Problem List Items Addressed This Visit             ICD-10-CM    Status post arthroscopy of left shoulder - Primary Z98.890     Insurance:  Visit number: 9 of 20V PCY 0 USED NO AUTH NEEDED PAYS AT 65% AFTER DED 2800.00   Insurance Type: Payor: [facility] / Plan: [employer] [facility] Insurance is changing    General:  Surgery: Left Shoulder Arthroscopic Rotator Cuff Repair - Left, Debridement - Left, and Subacromial Decompression - Left  Date of Last Surgery: 1/17/2025   6 weeks post op = 2/28/25  12 weeks post op = 4/11/25  16 weeks = 5/9/24  Post-Op Days: 32   Referred by: Burton Davis MD Next MD appt:  04/29/25     Precautions:  MASSIVE TEAR MODIFICATION - Full thickness of supraspinatus and infraspinatus, (subcap was intact)  Fall Risk: None    Subjective:   Nancy reports she feels like her condition is improving. Patient reports Improved ability to sleep thru the night. Improved reaching ability. Improved ability to complete household activities. Improved ability to participate in sport. Reduced intensity of pain. Reduced pain level.   Pain (0-10): 0    HEP adherence / understanding: patient reports compliance with the instructed home exercises.    Assessment:   Education: Home exercise program instructed and issued. Updated with yellow band on 4/15.  Progress towards functional goals:  see objective.  Progressing.  Response to interventions: Patient is 12 weeks postop.  We initiated light strengthening with a yellow band today - 2 sets of 10-15 reps.   Justification for continued skilled care: To address remaining functional, objective and subjective deficits to allow the patient to return to full independence with  "ADLs.    Plan:  Assess response to light strengthening.  Modify as able.    Objective:      4/15/25:  Dash: raw = 14/ Disability = 6%;   AROM: flex: 130 Abd: 120 ER in 0 Abd: 50  IR: PSIS    Treatment Performed: (\"NP\" = Not Performed)     Therapeutic Exercise:     35 minutes  Home exercise program instructed and issued on 25  Access Code: [email or web address]  Pulleys:  minutes facing away wall.  Row: Yellow 2 x 15  LAE: Yellow 2 x 15  ER: Yellow 2 x 10   IR: Yellow 2 x 15   UBE: L3 2.5 x 2.5   Roll Swiss Ball up wall: 3 x 10   Standing OH wand press: 3 x 10   Ball on wall 90 flexion and 90 Abd: up /down, side/side x 10 - 20 ea   S/l ABC x1  (1# too much on 4/15)  **ACTIVITIES BELOW WERE NOT PERFORMED**   Wand Extension 3 x 10   Bent over Extension: x 15   Bent over T - (horiz Abd) x 15 ea with palm down/thumb up  Bent over Y - x 15 ea with palm down/thumb up  Standing Shoulder ER AAROM with Dowel  2 x 10  - 3 second hold  S/l'ing Abduction AROM x 20   S/l'ing: flexion AROM x 20  S/l ER AROM x 20   S/l ABC in 90 flexion x 1    **ACTIVITIES BELOW WERE NOT PERFORMED**   AAROM Wand scaption  2 x 10 , Wand Abd  2 x 10   Standing shoulder extension stretch:  2 x 10  Post capsule/Horiz Add stretchin\" x 10   Supine Shoulder Flexion AAROM with Hands Clasped  2 x 10   Swiss ball flexion at wall x 10 yellow ball  Elbow extension with hand on ball oscillations flex/ext, abd/add and circles 2 x 10 ea  Towel wipes seated at plinth 2 x 10      Manual Therapy:     8 minutes  Supine Prom ER/ABD/Flexion/Scaption.  Seated STM to the UT, infraspinatus, levator.    Neuromuscular Re-education:      minutes      Modalities:       Vasopneumatic Device       minutes  Electrical Stimulation          minutes  Ultrasound            minutes  Iontophoresis                     minutes  Cold Pack            minutes  Mechanical Traction           minutes    Gait Training:            minutes      Aquatics:            minutes      Therapeutic Activity: "      minutes      Self Care Home Management:    minutes    Canalith Reposition:          minutes     Education:          minutes    Other:       minutes      Evaluation Complexity: Low:   minutes; Moderate   minutes; Complex   minutes    Re-Evaluation:   minutes

## 2025-04-15 ENCOUNTER — TREATMENT (OUTPATIENT)
Dept: PHYSICAL THERAPY | Facility: CLINIC | Age: 65
End: 2025-04-15
Payer: MEDICARE

## 2025-04-15 DIAGNOSIS — Z98.890 STATUS POST ARTHROSCOPY OF LEFT SHOULDER: Primary | ICD-10-CM

## 2025-04-15 PROCEDURE — 97110 THERAPEUTIC EXERCISES: CPT | Mod: GP | Performed by: PHYSICAL THERAPIST

## 2025-04-15 PROCEDURE — 97140 MANUAL THERAPY 1/> REGIONS: CPT | Mod: GP | Performed by: PHYSICAL THERAPIST

## 2025-04-22 ENCOUNTER — TREATMENT (OUTPATIENT)
Dept: PHYSICAL THERAPY | Facility: CLINIC | Age: 65
End: 2025-04-22
Payer: MEDICARE

## 2025-04-22 DIAGNOSIS — Z98.890 STATUS POST ARTHROSCOPY OF LEFT SHOULDER: ICD-10-CM

## 2025-04-22 PROCEDURE — 97110 THERAPEUTIC EXERCISES: CPT | Mod: GP,CQ

## 2025-04-22 PROCEDURE — 97140 MANUAL THERAPY 1/> REGIONS: CPT | Mod: GP,CQ

## 2025-04-22 NOTE — PROGRESS NOTES
PHYSICAL THERAPY TREATMENT NOTE    Patient Name:  Nancy Marcus   Patient MRN: [id number]  Date: 4/22/2025    Time Calculation  Start Time: 0917  Stop Time: 1000  Time Calculation (min): 43 min      Problem List Items Addressed This Visit           ICD-10-CM    Status post arthroscopy of left shoulder Z98.890       Insurance:  Visit number: 10 of 20V PCY 0 USED NO AUTH NEEDED PAYS AT 65% AFTER DED 2800.00   Insurance Type: Payor: [facility] / Plan: [facility] [facility] Insurance is changing    General:  Surgery: Left Shoulder Arthroscopic Rotator Cuff Repair - Left, Debridement - Left, and Subacromial Decompression - Left  Date of Last Surgery: 1/17/2025   6 weeks post op = 2/28/25  12 weeks post op = 4/11/25  16 weeks = 5/9/24  Post-Op Days: 32   Referred by: Burton Davis MD Next MD appt:  04/29/25     Precautions:  MASSIVE TEAR MODIFICATION - Full thickness of supraspinatus and infraspinatus, (subcap was intact)  Fall Risk: None    Subjective:   Nancy reports she feels like her condition is improving. Patient reports her L shoulder has been kind of sore from last session.    Pain (0-10): 0    HEP adherence / understanding: patient reports compliance with the instructed home exercises.    Assessment:   Education: Reviewed home exercise program. Provided verbal feedback to improve exercise technique.  Progress towards functional goals:  Progressing with improvement of OH reaching. Reduced frequency of pain. Reduced intensity of pain.  Response to interventions:  Soreness persists from previous progression to strengthening with therabands, but able to complete all reps this date. Progressed with supine serratus punches and L UT stretch to address soft tissue restrictions. Patient tolerated therapeutic interventions well and without any adverse events. Tolerated treatment session well without any increase in pain. Improved joint  "mobility.  Patient was appropriately fatigued with no complaints.  Justification for continued skilled care: To address remaining functional, objective and subjective deficits to allow the patient to return to full independence with ADLs. Skilled intervention required to improve ROM which will improve function. Progressive strengthening to stabilize the L shoulder to improve function.      Plan:  Continue with light strengthening.  Modify as able.    Objective:     Treatment Performed: (\"NP\" = Not Performed)     Therapeutic Exercise:     30 minutes  Home exercise program instructed and issued on 25  Access Code: [email or web address]  Pulleys: 3 minutes facing away wall.  Row: Yellow 2 x 15  LAE: Yellow 2 x 15  ER: Yellow 2 x 10   IR: Yellow 2 x 15   UBE: L3 2.5 x 2.5   Roll Swiss Ball up wall: 3 x 10   Standing OH wand press: 3 x 10   Ball on wall 90 flexion and 90 Abd: up /down, side/side, circles CW/CCW x 10 - 20 ea   S/l ABC x1  (1# too much on 4/15)  Supine serratus punches 3 x 10  Seated UT stretches 20-30 sec x 3    **ACTIVITIES BELOW WERE NOT PERFORMED**   Wand Extension 3 x 10   Bent over Extension: x 15   Bent over T - (horiz Abd) x 15 ea with palm down/thumb up  Bent over Y - x 15 ea with palm down/thumb up  Standing Shoulder ER AAROM with Dowel  2 x 10  - 3 second hold  S/l'ing Abduction AROM x 20   S/l'ing: flexion AROM x 20  S/l ER AROM x 20     **ACTIVITIES BELOW WERE NOT PERFORMED**   AAROM Wand scaption  2 x 10 , Wand Abd  2 x 10   Standing shoulder extension stretch:  2 x 10  Post capsule/Horiz Add stretchin\" x 10   Supine Shoulder Flexion AAROM with Hands Clasped  2 x 10   Elbow extension with hand on ball oscillations flex/ext, abd/add and circles 2 x 10 ea  Towel wipes seated at int 2 x 10      Manual Therapy:     13 minutes  Supine Prom ER/ABD/Flexion/Scaption.  Seated STM to the UT, infraspinatus, levator.    Neuromuscular Re-education:      minutes      Modalities:       Vasopneumatic Device   "     minutes  Electrical Stimulation          minutes  Ultrasound            minutes  Iontophoresis                     minutes  Cold Pack            minutes  Mechanical Traction           minutes    Gait Training:            minutes      Aquatics:            minutes      Therapeutic Activity:      minutes      Self Care Home Management:    minutes    Canalith Reposition:          minutes     Education:          minutes    Other:       minutes      Evaluation Complexity: Low:   minutes; Moderate   minutes; Complex   minutes    Re-Evaluation:   minutes

## 2025-04-24 DIAGNOSIS — J01.90 ACUTE NON-RECURRENT SINUSITIS, UNSPECIFIED LOCATION: Primary | ICD-10-CM

## 2025-04-24 RX ORDER — DOXYCYCLINE 100 MG/1
100 CAPSULE ORAL 2 TIMES DAILY
Qty: 20 CAPSULE | Refills: 0 | Status: SHIPPED | OUTPATIENT
Start: 2025-04-24 | End: 2025-05-04

## 2025-04-29 ENCOUNTER — OFFICE VISIT (OUTPATIENT)
Dept: ORTHOPEDIC SURGERY | Facility: CLINIC | Age: 65
End: 2025-04-29
Payer: MEDICARE

## 2025-04-29 ENCOUNTER — HOSPITAL ENCOUNTER (OUTPATIENT)
Dept: RADIOLOGY | Facility: CLINIC | Age: 65
Discharge: HOME | End: 2025-04-29
Payer: MEDICARE

## 2025-04-29 ENCOUNTER — TREATMENT (OUTPATIENT)
Dept: PHYSICAL THERAPY | Facility: CLINIC | Age: 65
End: 2025-04-29
Payer: MEDICARE

## 2025-04-29 DIAGNOSIS — Z98.890 STATUS POST ARTHROSCOPY OF LEFT SHOULDER: ICD-10-CM

## 2025-04-29 DIAGNOSIS — M25.512 LEFT SHOULDER PAIN, UNSPECIFIED CHRONICITY: ICD-10-CM

## 2025-04-29 DIAGNOSIS — M25.512 ACUTE PAIN OF LEFT SHOULDER: Primary | ICD-10-CM

## 2025-04-29 PROCEDURE — 1036F TOBACCO NON-USER: CPT | Performed by: ORTHOPAEDIC SURGERY

## 2025-04-29 PROCEDURE — 1160F RVW MEDS BY RX/DR IN RCRD: CPT | Performed by: ORTHOPAEDIC SURGERY

## 2025-04-29 PROCEDURE — 99213 OFFICE O/P EST LOW 20 MIN: CPT | Performed by: ORTHOPAEDIC SURGERY

## 2025-04-29 PROCEDURE — 73030 X-RAY EXAM OF SHOULDER: CPT | Mod: LT

## 2025-04-29 PROCEDURE — 97140 MANUAL THERAPY 1/> REGIONS: CPT | Mod: GP,CQ

## 2025-04-29 PROCEDURE — 73030 X-RAY EXAM OF SHOULDER: CPT | Mod: LEFT SIDE | Performed by: RADIOLOGY

## 2025-04-29 PROCEDURE — 1159F MED LIST DOCD IN RCRD: CPT | Performed by: ORTHOPAEDIC SURGERY

## 2025-04-29 PROCEDURE — 97110 THERAPEUTIC EXERCISES: CPT | Mod: GP,CQ

## 2025-04-29 NOTE — PROGRESS NOTES
PHYSICAL THERAPY TREATMENT NOTE    Patient Name:  Nancy Marcus   Patient MRN: [id number]  Date: 4/29/2025    Time Calculation  Start Time: 0915  Stop Time: 1000  Time Calculation (min): 45 min      Problem List Items Addressed This Visit           ICD-10-CM    Status post arthroscopy of left shoulder Z98.890     Insurance:  Visit number: 11 of 20V PCY 0 USED NO AUTH NEEDED PAYS AT 65% AFTER DED 2800.00   Insurance Type: Payor: [facility] / Plan: [facility] Insurance is changing    General:  Surgery: Left Shoulder Arthroscopic Rotator Cuff Repair - Left, Debridement - Left, and Subacromial Decompression - Left  Date of Last Surgery: 1/17/2025   6 weeks post op = 2/28/25  12 weeks post op = 4/11/25  16 weeks = 5/9/24  Post-Op Days: 32   Referred by: Burton Davis MD  Next MD appt:  nothing scheduled at this time    Precautions:  MASSIVE TEAR MODIFICATION - Full thickness of supraspinatus and infraspinatus, (subcap was intact)  Fall Risk: None    Subjective:   Nancy reports she feels like her condition is improving. Patient saw MD before session and is pleased with progress. Minimal soreness still with the theraband exercises. Turned over on the L side in bed and it did not work yet.   Pain (0-10): 0    HEP adherence / understanding: patient reports compliance with the instructed home exercises.    Assessment:   Education: Reviewed home exercise program. Provided verbal feedback to improve exercise technique.  Progress towards functional goals:  Progressing well with OH reaching. Reduced frequency of pain. Reduced intensity of pain.  Response to interventions:  Progressed to wall push ups with good tolerance. Initiated prone rows and extension with light weight and Ts without weight displaying mild muscular fatigue overall.  Patient tolerated therapeutic interventions well and without any adverse events. Tolerated  "treatment session well without any increase in pain. Improved joint mobility.  Patient was appropriately fatigued with no complaints.  Justification for continued skilled care: To address remaining functional, objective and subjective deficits to allow the patient to return to full independence with ADLs. Skilled intervention required to improve ROM which will improve function. Progressive strengthening to stabilize the L shoulder to improve function.      Plan:  Continue with light strengthening.  Modify as able.    Objective:     Treatment Performed: (\"NP\" = Not Performed)     Therapeutic Exercise:     35 minutes  Home exercise program instructed and issued on 25  Access Code: [email or web address]  Pulleys: 3 minutes facing away wall.  Row: Yellow 2 x 15  LAE: Yellow 2 x 15  ER: Yellow 2 x 10   IR: Yellow 2 x 15   UBE: L3 2.5 x 2.5   Roll Swiss Ball up wall: 3 x 10   Standing OH wand press: 3 x 10   Ball on wall 90 flexion and 90 Abd: up /down, side/side, circles CW/CCW x 30   S/l ABC x1 1#  Supine serratus punches 3 x 10 1#  Seated UT stretches 20-30 sec x 3  Wall push ups 2 x 10  Prone rows/extension with 1# 2 x 10 /Ts 0# 2 x 10    **ACTIVITIES BELOW WERE NOT PERFORMED**   Wand Extension 3 x 10   Bent over Extension: x 15   Bent over T - (horiz Abd) x 15 ea with palm down/thumb up  Bent over Y - x 15 ea with palm down/thumb up  Standing Shoulder ER AAROM with Dowel  2 x 10  - 3 second hold  S/l'ing Abduction AROM x 20   S/l'ing: flexion AROM x 20  S/l ER AROM x 20     **ACTIVITIES BELOW WERE NOT PERFORMED**   AAROM Wand scaption  2 x 10 , Wand Abd  2 x 10   Standing shoulder extension stretch:  2 x 10  Post capsule/Horiz Add stretchin\" x 10   Supine Shoulder Flexion AAROM with Hands Clasped  2 x 10   Elbow extension with hand on ball oscillations flex/ext, abd/add and circles 2 x 10 ea  Towel wipes seated at [facility] 2 x 10      Manual Therapy:     10 minutes  Supine Prom ER/ABD/Flexion/Scaption.  Seated STM to the " UT, infraspinatus, levator.    Neuromuscular Re-education:      minutes      Modalities:       Vasopneumatic Device       minutes  Electrical Stimulation          minutes  Ultrasound            minutes  Iontophoresis                     minutes  Cold Pack            minutes  Mechanical Traction           minutes    Gait Training:            minutes      Aquatics:            minutes      Therapeutic Activity:      minutes      Self Care Home Management:    minutes    Canalith Reposition:          minutes     Education:          minutes    Other:       minutes      Evaluation Complexity: Low:   minutes; Moderate   minutes; Complex   minutes    Re-Evaluation:   minutes            (3) no apparent problem

## 2025-05-02 NOTE — PROGRESS NOTES
65-year-old is seen for her left shoulder.  She has been improving.  She had a left shoulder arthroscopy with rotator cuff repair January 17, 2025.  She has had continued improvement in physical therapy.    Pleasant in no acute distress.  Left shoulder forward flexion 165 degrees.  No tenderness.  There is continued improving strength.    Multiple x-ray views of the left shoulder are personally reviewed and there is a good subacromial decompression and no acute bony abnormality.    She is progressing well.  She continue with the exercise program.  Gradually progress activities.  Precautions reviewed.  Follow-up if symptomatic in 2 to 3 months.

## 2025-05-13 ENCOUNTER — TREATMENT (OUTPATIENT)
Dept: PHYSICAL THERAPY | Facility: CLINIC | Age: 65
End: 2025-05-13
Payer: MEDICARE

## 2025-05-13 DIAGNOSIS — Z98.890 STATUS POST ARTHROSCOPY OF LEFT SHOULDER: Primary | ICD-10-CM

## 2025-05-13 PROCEDURE — 97110 THERAPEUTIC EXERCISES: CPT | Mod: GP | Performed by: PHYSICAL THERAPIST

## 2025-05-13 PROCEDURE — 97140 MANUAL THERAPY 1/> REGIONS: CPT | Mod: GP | Performed by: PHYSICAL THERAPIST

## 2025-05-13 NOTE — PROGRESS NOTES
PHYSICAL THERAPY TREATMENT NOTE    Patient Name:  Nancy Marcus   Patient MRN: [id number]  Date: 5/13/2025    Time Calculation  Start Time: 0830  Stop Time: 0915  Time Calculation (min): 45 min      Problem List Items Addressed This Visit           ICD-10-CM    Status post arthroscopy of left shoulder - Primary Z98.890       Insurance:  Visit number: 12 of 20V PCY 0 USED NO AUTH NEEDED PAYS AT 65% AFTER DED 2800.00   Insurance Type: Payor: [facility] / Plan: [facility] Insurance is changing    General:  Surgery: Left Shoulder Arthroscopic Rotator Cuff Repair - Left, Debridement - Left, and Subacromial Decompression - Left  Date of Last Surgery: 1/17/2025   6 weeks post op = 2/28/25  12 weeks post op = 4/11/25  16 weeks = 5/9/24  Post-Op Days: 32   Referred by: Burton Davis MD Next MD appt:  nothing scheduled at this time    Precautions:  MASSIVE TEAR MODIFICATION - Full thickness of supraspinatus and infraspinatus, (subcap was intact)  Fall Risk: None    Subjective:   Occasional random intermittent mild soreness - not pain.  Pain 0/10 upon starting session.   Patient reports Improved ability to sleep thru the night. Improved lifting ability. Improved reaching ability.   Pain (0-10): 0    HEP adherence / understanding: patient reports compliance with the instructed home exercises.    Assessment:   Education: Reviewed home exercise program.  Progress towards functional goals: Improved lifting ability. Improved reaching ability.  Response to interventions: She was fatigued with OH activities.   Patient tolerated therapeutic interventions well and without any adverse events.  Justification for continued skilled care: To address remaining functional, objective and subjective deficits to allow the patient to return to full independence with ADLs.    Plan:  Exercises targeting surrounding musculature to strengthen and  "improve function.    Objective:   5/13/25: AROM of the Left shoulder Flexion:145   Abd:145   ER with arm at side: 50; Q Dash = raw score of 14 or 7%      Treatment Performed: (\"NP\" = Not Performed)     Therapeutic Exercise:     37 minutes  Home exercise  - updated with RED band. 5/13  Access Code: [email or web address]  Pulleys: 3 minutes facing away wall.  Row: Red 2 x 15  OH press 1# 2 x 15  LAE: Red 2 x 15  Tband Press: Red 2 x 15  OH mirror wash 1# side<>side 2 x 20   ER: Red 2 x 10   IR: Red 2 x 15  Bicep Curl: 2 x 10   Mirror up and down 15 x 1, 12 x 1 with 1# weight at wrist.  UBE: L3 2.5 x 2.5   Belt IR stretch:   5\" x 10   Ball on wall 90 flexion and 90 Abd: up /down, side/side, circles CW/CCW x 30     **ACTIVITIES BELOW WERE NOT PERFORMED**   Roll Swiss Ball up wall: 3 x 10   S/l ABC x1 1#  Supine serratus punches 3 x 10 1#  Seated UT stretches 20-30 sec x 3  Wall push ups 2 x 10  Prone rows/extension with 1# 2 x 10 /Ts 0# 2 x 10  Standing OH wand press: 3 x 10       Manual Therapy:     8 minutes  Seated STM to the UT, infraspinatus, levator.    Neuromuscular Re-education:      minutes      Modalities:       Vasopneumatic Device       minutes  Electrical Stimulation          minutes  Ultrasound            minutes  Iontophoresis                     minutes  Cold Pack            minutes  Mechanical Traction           minutes    Gait Training:            minutes      Aquatics:            minutes      Therapeutic Activity:      minutes      Self Care Home Management:    minutes    Canalith Reposition:          minutes     Education:          minutes    Other:       minutes      Evaluation Complexity: Low:   minutes; Moderate   minutes; Complex   minutes    Re-Evaluation:   minutes           "

## 2025-05-19 NOTE — PROGRESS NOTES
PHYSICAL THERAPY TREATMENT NOTE    Patient Name:  Nancy Marcus   Patient MRN: [id number]  Date: 5/20/2025    Time Calculation  Start Time: 0830  Stop Time: 0915  Time Calculation (min): 45 min      Problem List Items Addressed This Visit           ICD-10-CM    Status post arthroscopy of left shoulder - Primary Z98.890     Insurance:  Visit number: 13 of 20V PCY 0 USED NO AUTH NEEDED PAYS AT 65% AFTER DED 2800.00   Insurance Type: Payor: MEDICAL [facility] / Plan: [employer] [facility] Insurance is changing    General:  Surgery: Left Shoulder Arthroscopic Rotator Cuff Repair - Left, Debridement - Left, and Subacromial Decompression - Left  Date of Last Surgery: 1/17/2025   6 weeks post op = 2/28/25  12 weeks post op = 4/11/25  16 weeks = 5/9/24  Post-Op Days: 32   Referred by: Burton Davis MD  Next MD appt:  nothing scheduled at this time    Precautions:  MASSIVE TEAR MODIFICATION - Full thickness of supraspinatus and infraspinatus, (subcap was intact)  Fall Risk: None    Subjective:   Nancy reports she feels like her condition is improving. Patient reports she has minimal pain    Pain (0-10): 0    HEP adherence / understanding: patient reports compliance with the instructed home exercises.    Assessment:   Education: Reviewed home exercise program. Isseud Green band today  Progress towards functional goals: functionally doing very well with home activities.  No real limitations.  Pain is not an issue.  AROM is doing well.  Response to interventions: Patient tolerated therapeutic interventions well and without any adverse events.  Justification for continued skilled care: To address remaining functional, objective and subjective deficits to allow the patient to return to full independence with ADLs.    Plan:  Continue x 2 more sessions then reassess and likely DC to HEP.    Objective:   5/13/25: AROM of the Left shoulder Flexion:145    "Abd:145   ER with arm at side: 50; Q Dash = raw score of 14 or 7%    Treatment Performed: (\"NP\" = Not Performed)     Therapeutic Exercise:     35 minutes  Home exercise  - updated with RED band. 5/13  Access Code: [email or web address]  Pulleys: 3 minutes facing away wall.  Row: Green 2 x 15  OH press 1# 1 x 15, 1 x 10   LAE: Green 2 x 15  ER: Green 2 x 10   IR: Green 2 x 15  Tband Press: Green 2 x 15  OH mirror wash 1# side<>side 2 x 20     Bicep Curl: 3# 2 x 10   Mirror up and down 15 x 1, 12 x 1 with 1# weight at wrist.  UBE: L3 2.5 x 2.5   Belt IR stretch:   5\" x 10   Ball on wall 90 flexion and 90 Abd: up /down, side/side, circles CW/CCW x 30     **ACTIVITIES BELOW WERE NOT PERFORMED**   Roll Swiss Ball up wall: 3 x 10   S/l ABC x1 1#  Supine serratus punches 3 x 10 1#  Seated UT stretches 20-30 sec x 3  Wall push ups 2 x 10  Prone rows/extension with 1# 2 x 10 /Ts 0# 2 x 10  Standing OH wand press: 3 x 10       Manual Therapy:     10 minutes  Seated STM to the UT, infraspinatus, levator.    Neuromuscular Re-education:      minutes      Modalities:       Vasopneumatic Device       minutes  Electrical Stimulation          minutes  Ultrasound            minutes  Iontophoresis                     minutes  Cold Pack            minutes  Mechanical Traction           minutes    Gait Training:            minutes      Aquatics:            minutes      Therapeutic Activity:      minutes      Self Care Home Management:    minutes    Canalith Reposition:          minutes     Education:          minutes    Other:       minutes      Evaluation Complexity: Low:   minutes; Moderate   minutes; Complex   minutes    Re-Evaluation:   minutes           "

## 2025-05-20 ENCOUNTER — TREATMENT (OUTPATIENT)
Dept: PHYSICAL THERAPY | Facility: CLINIC | Age: 65
End: 2025-05-20
Payer: MEDICARE

## 2025-05-20 DIAGNOSIS — Z98.890 STATUS POST ARTHROSCOPY OF LEFT SHOULDER: Primary | ICD-10-CM

## 2025-05-20 PROCEDURE — 97110 THERAPEUTIC EXERCISES: CPT | Mod: GP | Performed by: PHYSICAL THERAPIST

## 2025-05-20 PROCEDURE — 97140 MANUAL THERAPY 1/> REGIONS: CPT | Mod: GP | Performed by: PHYSICAL THERAPIST

## 2025-05-27 ENCOUNTER — HOSPITAL ENCOUNTER (OUTPATIENT)
Dept: RADIOLOGY | Facility: CLINIC | Age: 65
Discharge: HOME | End: 2025-05-27
Payer: MEDICARE

## 2025-05-27 ENCOUNTER — DOCUMENTATION (OUTPATIENT)
Dept: SURGERY | Facility: CLINIC | Age: 65
End: 2025-05-27

## 2025-05-27 ENCOUNTER — TREATMENT (OUTPATIENT)
Dept: PHYSICAL THERAPY | Facility: CLINIC | Age: 65
End: 2025-05-27
Payer: MEDICARE

## 2025-05-27 VITALS — HEIGHT: 62 IN | WEIGHT: 204.81 LBS | BODY MASS INDEX: 37.69 KG/M2

## 2025-05-27 DIAGNOSIS — Z98.890 STATUS POST ARTHROSCOPY OF LEFT SHOULDER: ICD-10-CM

## 2025-05-27 DIAGNOSIS — R92.8 OTHER ABNORMAL AND INCONCLUSIVE FINDINGS ON DIAGNOSTIC IMAGING OF BREAST: ICD-10-CM

## 2025-05-27 DIAGNOSIS — R92.8 ABNORMAL FINDING ON BREAST IMAGING: ICD-10-CM

## 2025-05-27 DIAGNOSIS — Z12.31 SCREENING MAMMOGRAM FOR BREAST CANCER: ICD-10-CM

## 2025-05-27 PROCEDURE — 77062 BREAST TOMOSYNTHESIS BI: CPT

## 2025-05-27 PROCEDURE — 76982 USE 1ST TARGET LESION: CPT | Mod: RT

## 2025-05-27 PROCEDURE — 77066 DX MAMMO INCL CAD BI: CPT | Performed by: RADIOLOGY

## 2025-05-27 PROCEDURE — 97110 THERAPEUTIC EXERCISES: CPT | Mod: GP,CQ

## 2025-05-27 PROCEDURE — G0279 TOMOSYNTHESIS, MAMMO: HCPCS | Performed by: RADIOLOGY

## 2025-05-27 PROCEDURE — 76642 ULTRASOUND BREAST LIMITED: CPT | Mod: RT

## 2025-05-27 PROCEDURE — 76642 ULTRASOUND BREAST LIMITED: CPT | Performed by: RADIOLOGY

## 2025-05-27 NOTE — PROGRESS NOTES
Renetta, mammogram tech met with Nancy Marcus  and discussed her need for right breast ultrasound biopsy. She was scheduled  6/6/25 for her surgeon appointment  and then her biopsy on 6/16/25. Renetta explained the process and procedure. Patient states that she understands. She was given the folder and breast navigator card to call with any further questions.

## 2025-05-27 NOTE — PROGRESS NOTES
PHYSICAL THERAPY TREATMENT NOTE    Patient Name:  Nancy Marcus   Patient MRN: [id number]  Date: 5/27/2025    Time Calculation  Start Time: 0928  Stop Time: 1000  Time Calculation (min): 32 min      Problem List Items Addressed This Visit           ICD-10-CM    Status post arthroscopy of left shoulder Z98.890       Insurance:  Visit number: 14 of 20V PCY 0 USED NO AUTH NEEDED PAYS AT 65% AFTER DED 2800.00   Insurance Type: Payor: [employer] [place] / Plan: [facility] Insurance is changing    General:  Surgery: Left Shoulder Arthroscopic Rotator Cuff Repair - Left, Debridement - Left, and Subacromial Decompression - Left  Date of Last Surgery: 1/17/2025   6 weeks post op = 2/28/25  12 weeks post op = 4/11/25  16 weeks = 5/9/24  Post-Op Days: 32   Referred by: Burton Davis MD  Next MD appt:  nothing scheduled at this time    Precautions:  MASSIVE TEAR MODIFICATION - Full thickness of supraspinatus and infraspinatus, (subcap was intact)  Fall Risk: None    Subjective:   Nancy reports she feels like her condition is improving. Patient reports she has no pain in the L shoulder.    Pain (0-10): 0    HEP adherence / understanding: patient reports compliance with the instructed home exercises.    Assessment:   Education: Reviewed home exercise program.    Progress towards functional goals: Patient indicates she has no functional limitations with her L shoulder. Denies pain.  Her AROM is still progressing.  Response to interventions: Challenge with trial of increasing weight for OH presses. Patient tolerated therapeutic interventions well and without any adverse events.  Justification for continued skilled care: To address remaining functional, objective and subjective deficits to allow the patient to return to full independence with ADLs.    Plan:  Next session is reassessment with evaluating PT.    Objective:   5/27/25: AROM of the  "Left shoulder Flexion:140   Abd:147   ER with arm at side: 55    Treatment Performed: (\"NP\" = Not Performed)     Therapeutic Exercise:     27 minutes  Home exercise  - updated with RED band. 5/13  Access Code: [email or web address]  Pulleys: 3 minutes facing away wall.  Row: Green 2 x 15  OH press 3# 1 x 10, 2# 1 x 10   LAE: Green 2 x 15  ER: Green 2 x 15   IR: Green 2 x 15  Tband Press: Green 2 x 15  OH mirror wash 1# side<>side 2 x 20   Mirror up and down 15 x 1, 12 x 1 with 1# weight at wrist.  Bicep Curl: 3# 2 x 10   UBE: L3 2.5 x 2.5   Belt IR stretch:   5\" x 10   Ball on wall 90 flexion and 90 Abd: up /down, side/side, circles CW/CCW x 30     **ACTIVITIES BELOW WERE NOT PERFORMED**   Roll Swiss Ball up wall: 3 x 10   S/l ABC x1 1#  Supine serratus punches 3 x 10 1#  Seated UT stretches 20-30 sec x 3  Wall push ups 2 x 10  Prone rows/extension with 1# 2 x 10 /Ts 0# 2 x 10  Standing OH wand press: 3 x 10       Manual Therapy:     5 minutes  Seated STM to the UT, infraspinatus, levator.    Neuromuscular Re-education:      minutes      Modalities:       Vasopneumatic Device       minutes  Electrical Stimulation          minutes  Ultrasound            minutes  Iontophoresis                     minutes  Cold Pack            minutes  Mechanical Traction           minutes    Gait Training:            minutes      Aquatics:            minutes      Therapeutic Activity:      minutes      Self Care Home Management:    minutes    Canalith Reposition:          minutes     Education:          minutes    Other:       minutes      Evaluation Complexity: Low:   minutes; Moderate   minutes; Complex   minutes    Re-Evaluation:   minutes           "

## 2025-06-02 DIAGNOSIS — F41.9 ANXIETY: Primary | ICD-10-CM

## 2025-06-02 RX ORDER — ALPRAZOLAM 0.5 MG/1
0.5 TABLET ORAL 3 TIMES DAILY PRN
Qty: 30 TABLET | Refills: 0 | Status: SHIPPED | OUTPATIENT
Start: 2025-06-02 | End: 2026-01-28

## 2025-06-02 NOTE — PROGRESS NOTES
PHYSICAL THERAPY TREATMENT NOTE    Patient Name:  Nancy Marcus   Patient MRN: [id number]  Date: 6/3/2025    Time Calculation  Start Time: 0835  Stop Time: 0852  Time Calculation (min): 17 min      Problem List Items Addressed This Visit           ICD-10-CM    Status post arthroscopy of left shoulder Z98.890         Insurance:  Visit number: 15 of 20V PCY 0 USED NO AUTH NEEDED PAYS AT 65% AFTER DED 2800.00   Insurance Type: Payor: MEDICAL [facility] / Plan: [facility] Insurance is changing    General:  Surgery: Left Shoulder Arthroscopic Rotator Cuff Repair - Left, Debridement - Left, and Subacromial Decompression - Left  Date of Last Surgery: 1/17/2025   6 weeks post op = 2/28/25  12 weeks post op = 4/11/25  16 weeks = 5/9/24  Post-Op Days: 32   Referred by: Burton Davis MD Next MD appt:  nothing scheduled at this time    Precautions:  MASSIVE TEAR MODIFICATION - Full thickness of supraspinatus and infraspinatus, (subcap was intact)  Fall Risk: None    Subjective:   Nancy reports she feels like her condition is improving. No difficulty sleeping, can lay on L side. Able to tolerate all daily activities at home. Feels confident in shoulder and able to strengthen at home with HEP      Pain (0-10): 0     Reassessment:   Nancy Marcus has completed 15 physical therapy sessions from 2/18/2025 to 6/3/25 to address L shoulder rotator cuff repair.  Treatment has included Therapeutic exercise, Manual therapy, Home program instruction and progression, and Vasopneumatic device.  she reports patient reports compliance with the instructed home exercises..  Nancy has made significant progress towards the established therapy goals. She presents with full ROM, 4/5 strength on all L shoulder motion, and demonstrates independence with HEP. Pt. Has met all goals and feels ready to continue strengthening independently at home.  At this time I  "recommend Nancy be discharged from physical therapy and continue with home exercises.    Goals:   Short term goals:   -Patient will demo correct posture with min to no cueing to allow for correct loading strategy.   6/3/25: MET    -Restore full shoulder AROM   6/3/25: MET, full ROM compared to R side      -Patient to demonstrate correct performance of HEP for symptom management   6/3/25: MET     -Pain< 4/10   6/3/25: MET, 0/10 pain coming in today      Long term goals:   -QuickDash= 19 % disability  to indicate a significant improvement in overall function.    6/3/25: MET, 5% disability      -Patient will demonstrate 4/5 rotator cuff strength (all planes) to allow for correct reach/lift mechanics.   6/3/25: MET, 4/5 on all motions in L shoulder      -Patient will demo mild to no limitation AROM of the R shoulder to allow for correct mechanics with functional mobility.    6/3/25: MET, full ROM with no difficulty with functional activities.      -Patient will report reaching overhead without pain to allow for return to work and ADLs without limitation.   6/3/25: MET, no difficulty reaching overhead      -Patient will report driving without pain to allow for return to work and ADLs without limitation.    6/3/25: MET, no difficulty driving      -Patient will return to work.   6/3/25: MET, retired with no difficulty with daily activities.      Outcome Measures:   , 5%    Objective:   6/3/25: AROM of L shoulder Flexion: Full ROM B          ABD: Full ROM B              ER arm at side: Full ROM B  5/27/25: AROM of the Left shoulder Flexion:140   Abd:147   ER with arm at side: 55    Treatment Performed: (\"NP\" = Not Performed)     Therapeutic Exercise:     17 minutes  Home exercise  - updated with RED band. 5/13  Access Code: [email or web address]  Pulleys: 3 minutes facing away wall.  Issued blue and black therabands to continue strengthening at home.     **ACTIVITIES BELOW WERE NOT PERFORMED**   Roll Swiss Ball up wall: 3 x 10   S/l " "ABC x1 1#  Supine serratus punches 3 x 10 1#  Seated UT stretches 20-30 sec x 3  Wall push ups 2 x 10  Prone rows/extension with 1# 2 x 10 /Ts 0# 2 x 10  Standing OH wand press: 3 x 10   Row: Green 2 x 15  OH press 3# 1 x 10, 2# 1 x 10   LAE: Green 2 x 15  ER: Green 2 x 15   IR: Green 2 x 15  Tband Press: Green 2 x 15  OH mirror wash 1# side<>side 2 x 20   Mirror up and down 15 x 1, 12 x 1 with 1# weight at wrist.  Bicep Curl: 3# 2 x 10   UBE: L3 2.5 x 2.5   Belt IR stretch:   5\" x 10   Ball on wall 90 flexion and 90 Abd: up /down, side/side, circles CW/CCW x 30       Manual Therapy:       minutes  Seated STM to the UT, infraspinatus, levator.    Neuromuscular Re-education:      minutes      Modalities:       Vasopneumatic Device       minutes  Electrical Stimulation          minutes  Ultrasound            minutes  Iontophoresis                     minutes  Cold Pack            minutes  Mechanical Traction           minutes    Gait Training:            minutes      Aquatics:            minutes      Therapeutic Activity:      minutes      Self Care Home Management:    minutes    Canalith Reposition:          minutes     Education:          minutes    Other:       minutes      Evaluation Complexity: Low:   minutes; Moderate   minutes; Complex   minutes    Re-Evaluation:   minutes                 "

## 2025-06-03 ENCOUNTER — TREATMENT (OUTPATIENT)
Dept: PHYSICAL THERAPY | Facility: CLINIC | Age: 65
End: 2025-06-03
Payer: MEDICARE

## 2025-06-03 DIAGNOSIS — Z98.890 STATUS POST ARTHROSCOPY OF LEFT SHOULDER: ICD-10-CM

## 2025-06-03 PROCEDURE — 97110 THERAPEUTIC EXERCISES: CPT | Mod: GP | Performed by: PHYSICAL THERAPIST

## 2025-06-05 NOTE — PROGRESS NOTES
History Of Present Illness  HPI   The patient is a 65-year-old female who had a recent abnormal mammogram.  She has not felt any breast lumps and has no breast pain.  No nipple discharge or retraction.  No previous breast biopsy.  Age of menarche was 13.  G2, P2.  Age of first childbirth was 29.  She had a hysterectomy in 2009 for benign disease.  Family history: Mother had breast cancer.  No other cancer runs in the family.    Past medical history:  Hypertension  Osteoarthritis  Hypothyroid  Depression and anxiety  Left shoulder arthroscopy in January 2025  Detached retina  Left midfoot fusion in 2017  Hemithyroidectomy for benign disease    Past Medical History  She has a past medical history of Anxiety, Arthritis, Hyperlipidemia, Hypertension, Hypothyroidism, Joint pain, and PONV (postoperative nausea and vomiting).    Surgical History  She has a past surgical history that includes Hysterectomy (2009); Oophorectomy (2009); Thyroid surgery; Colonoscopy; Foot surgery; and Breast biopsy (N/A, 1976).     Allergies  Patient has no known allergies.    Social History  She reports that she has never smoked. She has never used smokeless tobacco. She reports that she does not currently use alcohol. She reports that she does not use drugs.    Family History  Family History[1]    Review of Systems  Review of Systems:  Constitutional:  no fever, no chills, no significant weight change  Neurological: No history of CVA or seizure disorder  Eyes: No pain, no recent visual change  ENT:  No recent hearing loss  Neck: No pain  Cardiovascular: No chest pain, no history of cardiac disease such as myocardial infarction or arrhythmia or congestive heart failure  Pulmonary: No shortness of breath, no history of pulmonary disease such as pneumonia or COPD  Breast: As above  Gastrointestinal:   no abdominal pain, no nausea or vomiting, no constipation or diarrhea or blood in the stool.  GERD.  No history of ulcers.  No liver, gallbladder  "or pancreas disease.  No intestinal disorder.  Genitourinary: No hematuria or dysuria, no kidney disease  Musculoskeletal: Osteoarthritis of her feet and hands  Integumentary: History of eczema  Psychiatric: Anxiety and depression  Endocrine:  no history of diabetes  Hematologic/Lymphatic: No easy bruising or bleeding    Last Recorded Vitals  Blood pressure 150/87, pulse 87, temperature 36.8 °C (98.2 °F), temperature source Temporal, resp. rate 16, height 1.575 m (5' 2\"), weight 92.5 kg (204 lb), SpO2 97%.    Physical Exam  Constitutional: Well-developed, well-nourished, alert and oriented, no acute distress  Skin: Warm and dry, no lesions, no rashes, no jaundice  HEENT: Normocephalic, atraumatic, EOMI, no scleral icterus, mucous membranes moist, no lesions seen  Neck: Soft, nontender, no mass or adenopathy  Cardiac: Regular rate and rhythm, no murmur  Chest: Patent airway, clear to auscultation, normal breath sounds with good chest expansion, no wheezes or rales or rhonchi noted, thorax symmetric  Breast:     right breast: No skin changes, nontender, no mass, mild fibrocystic change    left breast: No skin changes, nontender, no mass, mild fibrocystic change  Abdomen: Nondistended, soft, nontender, no mass  Rectal: Not performed  Extremities: No injury, no lower extremity edema or calf tenderness  Lymphatic: No cervical or axillary adenopathy  Musculoskeletal: Range of motion intact, no joint swelling, normal strength  Neurological: Alert and oriented x3, intact sensory and motor function, no obvious focal neurologic abnormalities  Psychological: Appropriate mood and behavior  Examination chaperoned by Shen    Relevant Results  Labs from March 20, 2025: WBC 9.7, hemoglobin 14.5, platelet 196  Glucose 104, CMP otherwise normal    I reviewed the bilateral diagnostic mammogram and right breast ultrasound report and images from May 27, 2025:  IMPRESSION:  1. The mass in the 6 o'clock position of the right breast " middle  depth is no longer seen. This was likely a cyst which has  subsequently resolved.  2. Suspicious 1.1 cm mass upper-outer quadrant right breast for which  tissue diagnosis is recommended. This can be biopsied under  ultrasound guidance. This was discussed in depth with the patient.  She will be scheduled today for surgical consultation and subsequent  ultrasound-guided biopsy.  3. Normal ultrasound right axilla  BI-RADS Category:  4 Suspicious.  Recommendation:  Surgical Consultation and Biopsy.  Recommended Date:  Immediate.  Laterality:  Right.    Assessment/Plan   Diagnoses and all orders for this visit:  Abnormal mammogram of right breast    65-year-old female with unremarkable breast exam.    Mammogram/ultrasound shows a suspicious 1.1 cm diameter right breast upper outer quadrant mass for which the radiologist recommends ultrasound-guided biopsy.  Follow-up after biopsy completed.    Constantion Suarez MD         [1]   Family History  Problem Relation Name Age of Onset    Breast cancer Mother Eleanor     Brain Aneurysm Father

## 2025-06-06 ENCOUNTER — OFFICE VISIT (OUTPATIENT)
Dept: SURGERY | Facility: CLINIC | Age: 65
End: 2025-06-06
Payer: MEDICARE

## 2025-06-06 VITALS
RESPIRATION RATE: 16 BRPM | WEIGHT: 204 LBS | BODY MASS INDEX: 37.54 KG/M2 | TEMPERATURE: 98.2 F | HEART RATE: 87 BPM | HEIGHT: 62 IN | SYSTOLIC BLOOD PRESSURE: 150 MMHG | DIASTOLIC BLOOD PRESSURE: 87 MMHG | OXYGEN SATURATION: 97 %

## 2025-06-06 DIAGNOSIS — I10 BENIGN ESSENTIAL HYPERTENSION: ICD-10-CM

## 2025-06-06 DIAGNOSIS — R92.8 ABNORMAL MAMMOGRAM OF RIGHT BREAST: Primary | ICD-10-CM

## 2025-06-06 PROCEDURE — 3008F BODY MASS INDEX DOCD: CPT | Performed by: SURGERY

## 2025-06-06 PROCEDURE — 99203 OFFICE O/P NEW LOW 30 MIN: CPT | Performed by: SURGERY

## 2025-06-06 PROCEDURE — 1126F AMNT PAIN NOTED NONE PRSNT: CPT | Performed by: SURGERY

## 2025-06-06 PROCEDURE — 3077F SYST BP >= 140 MM HG: CPT | Performed by: SURGERY

## 2025-06-06 PROCEDURE — 99213 OFFICE O/P EST LOW 20 MIN: CPT | Performed by: SURGERY

## 2025-06-06 PROCEDURE — 1159F MED LIST DOCD IN RCRD: CPT | Performed by: SURGERY

## 2025-06-06 PROCEDURE — 1036F TOBACCO NON-USER: CPT | Performed by: SURGERY

## 2025-06-06 PROCEDURE — 3079F DIAST BP 80-89 MM HG: CPT | Performed by: SURGERY

## 2025-06-06 RX ORDER — LOSARTAN POTASSIUM 50 MG/1
50 TABLET ORAL DAILY
Qty: 90 TABLET | Refills: 3 | Status: SHIPPED | OUTPATIENT
Start: 2025-06-06

## 2025-06-06 SDOH — ECONOMIC STABILITY: FOOD INSECURITY: WITHIN THE PAST 12 MONTHS, YOU WORRIED THAT YOUR FOOD WOULD RUN OUT BEFORE YOU GOT MONEY TO BUY MORE.: NEVER TRUE

## 2025-06-06 SDOH — ECONOMIC STABILITY: FOOD INSECURITY: WITHIN THE PAST 12 MONTHS, THE FOOD YOU BOUGHT JUST DIDN'T LAST AND YOU DIDN'T HAVE MONEY TO GET MORE.: NEVER TRUE

## 2025-06-06 ASSESSMENT — PATIENT HEALTH QUESTIONNAIRE - PHQ9
SUM OF ALL RESPONSES TO PHQ9 QUESTIONS 1 AND 2: 0
1. LITTLE INTEREST OR PLEASURE IN DOING THINGS: NOT AT ALL
2. FEELING DOWN, DEPRESSED OR HOPELESS: NOT AT ALL

## 2025-06-06 ASSESSMENT — LIFESTYLE VARIABLES
HOW OFTEN DO YOU HAVE A DRINK CONTAINING ALCOHOL: MONTHLY OR LESS
AUDIT-C TOTAL SCORE: 1
SKIP TO QUESTIONS 9-10: 1
HOW OFTEN DO YOU HAVE SIX OR MORE DRINKS ON ONE OCCASION: NEVER
HOW MANY STANDARD DRINKS CONTAINING ALCOHOL DO YOU HAVE ON A TYPICAL DAY: 1 OR 2

## 2025-06-06 ASSESSMENT — COLUMBIA-SUICIDE SEVERITY RATING SCALE - C-SSRS
1. IN THE PAST MONTH, HAVE YOU WISHED YOU WERE DEAD OR WISHED YOU COULD GO TO SLEEP AND NOT WAKE UP?: NO
6. HAVE YOU EVER DONE ANYTHING, STARTED TO DO ANYTHING, OR PREPARED TO DO ANYTHING TO END YOUR LIFE?: NO
2. HAVE YOU ACTUALLY HAD ANY THOUGHTS OF KILLING YOURSELF?: NO

## 2025-06-06 ASSESSMENT — ENCOUNTER SYMPTOMS
OCCASIONAL FEELINGS OF UNSTEADINESS: 0
LOSS OF SENSATION IN FEET: 0

## 2025-06-06 ASSESSMENT — PAIN SCALES - GENERAL: PAINLEVEL_OUTOF10: 0-NO PAIN

## 2025-06-06 NOTE — LETTER
June 6, 2025     Jey Badillo DO  [address]  [address]  [facility] [address]    Patient: Nancy Marcus   YOB: 1960   Date of Visit: 6/6/2025       Dear Dr. Jey Badillo DO:    Thank you for referring Nancy Marcus to me for evaluation. Below are my notes for this consultation.  If you have questions, please do not hesitate to call me. I look forward to following your patient along with you.       Sincerely,     Constantino Suarez MD      CC: No Recipients  ______________________________________________________________________________________    History Of Present Illness  HPI   The patient is a 65-year-old female who had a recent abnormal mammogram.  She has not felt any breast lumps and has no breast pain.  No nipple discharge or retraction.  No previous breast biopsy.  Age of menarche was 13.  G2, P2.  Age of first childbirth was 29.  She had a hysterectomy in 2009 for benign disease.  Family history: Mother had breast cancer.  No other cancer runs in the family.    Past medical history:  Hypertension  Osteoarthritis  Hypothyroid  Depression and anxiety  Left shoulder arthroscopy in January 2025  Detached retina  Left midfoot fusion in 2017  Hemithyroidectomy for benign disease    Past Medical History  She has a past medical history of Anxiety, Arthritis, Hyperlipidemia, Hypertension, Hypothyroidism, Joint pain, and PONV (postoperative nausea and vomiting).    Surgical History  She has a past surgical history that includes Hysterectomy (2009); Oophorectomy (2009); Thyroid surgery; Colonoscopy; Foot surgery; and Breast biopsy (N/A, 1976).     Allergies  Patient has no known allergies.    Social History  She reports that she has never smoked. She has never used smokeless tobacco. She reports that she does not currently use alcohol. She reports that she does not use drugs.    Family History  Family History[1]    Review of Systems  Review of Systems:  Constitutional:  no  "fever, no chills, no significant weight change  Neurological: No history of CVA or seizure disorder  Eyes: No pain, no recent visual change  ENT:  No recent hearing loss  Neck: No pain  Cardiovascular: No chest pain, no history of cardiac disease such as myocardial infarction or arrhythmia or congestive heart failure  Pulmonary: No shortness of breath, no history of pulmonary disease such as pneumonia or COPD  Breast: As above  Gastrointestinal:   no abdominal pain, no nausea or vomiting, no constipation or diarrhea or blood in the stool.  GERD.  No history of ulcers.  No liver, gallbladder or pancreas disease.  No intestinal disorder.  Genitourinary: No hematuria or dysuria, no kidney disease  Musculoskeletal: Osteoarthritis of her feet and hands  Integumentary: History of eczema  Psychiatric: Anxiety and depression  Endocrine:  no history of diabetes  Hematologic/Lymphatic: No easy bruising or bleeding    Last Recorded Vitals  Blood pressure 150/87, pulse 87, temperature 36.8 °C (98.2 °F), temperature source Temporal, resp. rate 16, height 1.575 m (5' 2\"), weight 92.5 kg (204 lb), SpO2 97%.    Physical Exam  Constitutional: Well-developed, well-nourished, alert and oriented, no acute distress  Skin: Warm and dry, no lesions, no rashes, no jaundice  HEENT: Normocephalic, atraumatic, EOMI, no scleral icterus, mucous membranes moist, no lesions seen  Neck: Soft, nontender, no mass or adenopathy  Cardiac: Regular rate and rhythm, no murmur  Chest: Patent airway, clear to auscultation, normal breath sounds with good chest expansion, no wheezes or rales or rhonchi noted, thorax symmetric  Breast:     right breast: No skin changes, nontender, no mass, mild fibrocystic change    left breast: No skin changes, nontender, no mass, mild fibrocystic change  Abdomen: Nondistended, soft, nontender, no mass  Rectal: Not performed  Extremities: No injury, no lower extremity edema or calf tenderness  Lymphatic: No cervical or " axillary adenopathy  Musculoskeletal: Range of motion intact, no joint swelling, normal strength  Neurological: Alert and oriented x3, intact sensory and motor function, no obvious focal neurologic abnormalities  Psychological: Appropriate mood and behavior  Examination chaperoned by Shen    Relevant Results  Labs from March 20, 2025: WBC 9.7, hemoglobin 14.5, platelet 196  Glucose 104, CMP otherwise normal    I reviewed the bilateral diagnostic mammogram and right breast ultrasound report and images from May 27, 2025:  IMPRESSION:  1. The mass in the 6 o'clock position of the right breast middle  depth is no longer seen. This was likely a cyst which has  subsequently resolved.  2. Suspicious 1.1 cm mass upper-outer quadrant right breast for which  tissue diagnosis is recommended. This can be biopsied under  ultrasound guidance. This was discussed in depth with the patient.  She will be scheduled today for surgical consultation and subsequent  ultrasound-guided biopsy.  3. Normal ultrasound right axilla  BI-RADS Category:  4 Suspicious.  Recommendation:  Surgical Consultation and Biopsy.  Recommended Date:  Immediate.  Laterality:  Right.    Assessment/Plan  Diagnoses and all orders for this visit:  Abnormal mammogram of right breast    65-year-old female with unremarkable breast exam.    Mammogram/ultrasound shows a suspicious 1.1 cm diameter right breast upper outer quadrant mass for which the radiologist recommends ultrasound-guided biopsy.  Follow-up after biopsy completed.    Constantino Suarez MD         [1]  Family History  Problem Relation Name Age of Onset   • Breast cancer Mother Eleanor    • Brain Aneurysm Father          [1]  Family History  Problem Relation Name Age of Onset   • Breast cancer Mother Eleanor    • Brain Aneurysm Father

## 2025-06-16 ENCOUNTER — HOSPITAL ENCOUNTER (OUTPATIENT)
Dept: RADIOLOGY | Facility: CLINIC | Age: 65
Discharge: HOME | End: 2025-06-16
Payer: MEDICARE

## 2025-06-16 VITALS
HEART RATE: 88 BPM | DIASTOLIC BLOOD PRESSURE: 86 MMHG | OXYGEN SATURATION: 97 % | RESPIRATION RATE: 18 BRPM | SYSTOLIC BLOOD PRESSURE: 163 MMHG

## 2025-06-16 DIAGNOSIS — R92.8 ABNORMAL FINDING ON BREAST IMAGING: ICD-10-CM

## 2025-06-16 PROCEDURE — 2500000004 HC RX 250 GENERAL PHARMACY W/ HCPCS (ALT 636 FOR OP/ED): Performed by: RADIOLOGY

## 2025-06-16 PROCEDURE — C1739 HC OR 272 NO HCPCS: HCPCS

## 2025-06-16 PROCEDURE — 19083 BX BREAST 1ST LESION US IMAG: CPT | Mod: RT

## 2025-06-16 PROCEDURE — 2720000007 HC OR 272 NO HCPCS

## 2025-06-16 RX ORDER — LIDOCAINE HYDROCHLORIDE 10 MG/ML
INJECTION, SOLUTION EPIDURAL; INFILTRATION; INTRACAUDAL; PERINEURAL
Status: COMPLETED | OUTPATIENT
Start: 2025-06-16 | End: 2025-06-16

## 2025-06-16 RX ADMIN — LIDOCAINE HYDROCHLORIDE 5 ML: 10 INJECTION, SOLUTION EPIDURAL; INFILTRATION; INTRACAUDAL; PERINEURAL at 10:59

## 2025-06-16 RX ADMIN — LIDOCAINE HYDROCHLORIDE 4 ML: 10 INJECTION, SOLUTION EPIDURAL; INFILTRATION; INTRACAUDAL; PERINEURAL at 10:58

## 2025-06-16 ASSESSMENT — PAIN - FUNCTIONAL ASSESSMENT
PAIN_FUNCTIONAL_ASSESSMENT: 0-10
PAIN_FUNCTIONAL_ASSESSMENT: 0-10

## 2025-06-16 ASSESSMENT — PAIN SCALES - GENERAL
PAINLEVEL_OUTOF10: 0 - NO PAIN
PAINLEVEL_OUTOF10: 0 - NO PAIN

## 2025-06-20 LAB
LAB AP ASR DISCLAIMER: NORMAL
LAB AP BLOCK FOR ADDITIONAL STUDIES: NORMAL
LABORATORY COMMENT REPORT: NORMAL
PATH REPORT.COMMENTS IMP SPEC: NORMAL
PATH REPORT.FINAL DX SPEC: NORMAL
PATH REPORT.GROSS SPEC: NORMAL
PATH REPORT.RELEVANT HX SPEC: NORMAL
PATH REPORT.TOTAL CANCER: NORMAL
PATHOLOGY SYNOPTIC REPORT: NORMAL

## 2025-06-24 ENCOUNTER — DOCUMENTATION (OUTPATIENT)
Dept: SURGERY | Facility: CLINIC | Age: 65
End: 2025-06-24
Payer: MEDICARE

## 2025-06-24 ENCOUNTER — TELEPHONE (OUTPATIENT)
Dept: SURGERY | Facility: CLINIC | Age: 65
End: 2025-06-24
Payer: MEDICARE

## 2025-06-24 NOTE — TELEPHONE ENCOUNTER
Pt left message about her biopsy results that were resulted on my chart and wanted a call back. I called pt back and she has been made aware that Dr Suarez will be discussing her results at her follow up visit which is scheduled for 6/27/25. Pt was informed that the Dr will go over her results and will recommend a course of treatment from there.

## 2025-06-27 ENCOUNTER — OFFICE VISIT (OUTPATIENT)
Dept: SURGERY | Facility: CLINIC | Age: 65
End: 2025-06-27
Payer: MEDICARE

## 2025-06-27 VITALS
OXYGEN SATURATION: 97 % | DIASTOLIC BLOOD PRESSURE: 88 MMHG | BODY MASS INDEX: 34.96 KG/M2 | HEIGHT: 62 IN | SYSTOLIC BLOOD PRESSURE: 150 MMHG | WEIGHT: 190 LBS | TEMPERATURE: 98.1 F | RESPIRATION RATE: 18 BRPM | HEART RATE: 101 BPM

## 2025-06-27 DIAGNOSIS — C50.911 INFILTRATING DUCTAL CARCINOMA OF RIGHT BREAST: Primary | ICD-10-CM

## 2025-06-27 PROCEDURE — 99215 OFFICE O/P EST HI 40 MIN: CPT | Performed by: SURGERY

## 2025-06-27 PROCEDURE — 1159F MED LIST DOCD IN RCRD: CPT | Performed by: SURGERY

## 2025-06-27 PROCEDURE — 1036F TOBACCO NON-USER: CPT | Performed by: SURGERY

## 2025-06-27 PROCEDURE — 1126F AMNT PAIN NOTED NONE PRSNT: CPT | Performed by: SURGERY

## 2025-06-27 PROCEDURE — 3079F DIAST BP 80-89 MM HG: CPT | Performed by: SURGERY

## 2025-06-27 PROCEDURE — 3077F SYST BP >= 140 MM HG: CPT | Performed by: SURGERY

## 2025-06-27 PROCEDURE — 3008F BODY MASS INDEX DOCD: CPT | Performed by: SURGERY

## 2025-06-27 ASSESSMENT — ENCOUNTER SYMPTOMS
OCCASIONAL FEELINGS OF UNSTEADINESS: 0
DEPRESSION: 0
LOSS OF SENSATION IN FEET: 0

## 2025-06-27 ASSESSMENT — PAIN SCALES - GENERAL: PAINLEVEL_OUTOF10: 0-NO PAIN

## 2025-06-27 NOTE — LETTER
June 27, 2025     Jey Badillo DO  [address]  [address]  [facility] [address]    Patient: Nancy Marcus   YOB: 1960   Date of Visit: 6/27/2025       Dear Dr. Jey Badillo DO:    Thank you for referring Nancy Marcus to me for evaluation. Below are my notes for this consultation.  If you have questions, please do not hesitate to call me. I look forward to following your patient along with you.       Sincerely,     Constantino Suarez MD      CC: No Recipients  ______________________________________________________________________________________    History Of Present Illness  HPI   June 6, 2025  The patient is a 65-year-old female who had a recent abnormal mammogram.  She has not felt any breast lumps and has no breast pain.  No nipple discharge or retraction.  No previous breast biopsy.  Age of menarche was 13.  G2, P2.  Age of first childbirth was 29.  She had a hysterectomy in 2009 for benign disease.  Family history: Mother had breast cancer.  No other cancer runs in the family.    June 27, 2025  The patient follows up after having an ultrasound-guided core biopsy of the right breast.  No complaints regarding the biopsy.  She reports that her maternal grandfather had prostate cancer.  Uncertain whether it was metastatic.    Past medical history:  Hypertension  Osteoarthritis  Hypothyroid  Depression and anxiety  Left shoulder arthroscopy in January 2025  Detached retina  Left midfoot fusion in 2017  Hemithyroidectomy for benign disease     Past Medical History  She has a past medical history of Anxiety, Arthritis, Hyperlipidemia, Hypertension, Hypothyroidism, Joint pain, and PONV (postoperative nausea and vomiting).    Surgical History  She has a past surgical history that includes Hysterectomy (2009); Oophorectomy (2009); Thyroid surgery; Colonoscopy; Foot surgery; and Breast biopsy (N/A, 1976).     Allergies  Patient has no known allergies.    Social History  She  "reports that she has never smoked. She has never used smokeless tobacco. She reports current alcohol use. She reports that she does not use drugs.    Family History  Family History[1]      Last Recorded Vitals  Blood pressure 150/88, pulse 101, temperature 36.7 °C (98.1 °F), resp. rate 18, height 1.575 m (5' 2\"), weight 86.2 kg (190 lb), SpO2 97%.    Physical Exam   Constitutional: Well-developed, well-nourished, alert and oriented, no acute distress  Skin: Warm and dry, no lesions, no rashes, no jaundice  HEENT: Normocephalic, atraumatic, EOMI, no scleral icterus, mucous membranes moist, no lesions seen  Neck: Soft, nontender, no mass or adenopathy  Cardiac: Regular rate and rhythm, no murmur  Chest: Patent airway, clear to auscultation, normal breath sounds with good chest expansion, no wheezes or rales or rhonchi noted, thorax symmetric  Breast:     right breast: Mild ecchymosis laterally, no other skin changes, nontender, no mass    left breast: No skin changes, nontender, no mass, mild fibrocystic change  Abdomen: Nondistended, soft, nontender, no mass  Rectal: Not performed  Extremities: No injury, no lower extremity edema or calf tenderness  Lymphatic: No cervical or axillary adenopathy  Musculoskeletal: Range of motion intact, no joint swelling, normal strength  Neurological: Alert and oriented x3, intact sensory and motor function, no obvious focal neurologic abnormalities  Psychological: Appropriate mood and behavior  Examination chaperoned by Ana    Relevant Results  Surgical Pathology Exam: [id number]  Order: [id number]   Collected 6/16/2025 11:10       Status: Final result       Dx: Abnormal finding on breast imaging    Test Result Released: Yes (seen)    0 Result Notes       Component  Resulting Agency   FINAL DIAGNOSIS   A. Right breast mass, 9:00 5 cm from nipple, ultrasound guided core biopsy:   -- Invasive ductal carcinoma, grade 2, see note.  -- Ductal carcinoma in situ, intermediate nuclear " grade, solid, cribriform patterns.  -- Foci suspicious for lymphovascular invasion are present.      Note:  In this limited sample, the invasive carcinoma measures up to 8 mm in greatest dimension.  Immunohistochemical stain for e-cadherin is strongly membranous positive, supporting ductal phenotype.  ER, GA and HER2 will be reported in a synoptic report.       Electronically signed by Brianna Dixon MD on 6/20/2025 at 1108 EDT      [facility]   By the signature on this report, the individual or group listed as making the Final Interpretation/Diagnosis certifies that they have reviewed this case.    Comment  [facility]   The case was reviewed at Breast Consensus Conference via Zoom with concordance.   Case Summary Report   Breast Biomarker Reporting Template   Protocol posted: 12/13/2023BREAST BIOMARKER REPORTING TEMPLATE - All Specimens  Test(s) Performed     Estrogen Receptor (ER) Status  Positive (greater than 10% of cells demonstrate nuclear positivity)   Percentage of Cells with Nuclear Positivity  %   Average Intensity of Staining  Strong   Test Type  Food and Drug Administration (FDA) cleared (test / vendor): Roche CONFIRM anti-(ER) (SP1) Rabbit Monoclonal Primary Antibody, Roche Multimer Detection   Primary Antibody  SP1   Scoring System  No separate scoring system used   Test(s) Performed     Progesterone Receptor (PgR) Status  Positive   Percentage of Cells with Nuclear Positivity  81-90%   Average Intensity of Staining  Moderate   Test Type  Food and Drug Administration (FDA) cleared (test / vendor): Roche CONFIRM anti-(GA) (1E2) Rabbit Monoclonal Primary Anitbody, Roche Multimer Detection   Primary Antibody  1E2   Scoring System  No separate scoring system used   Test(s) Performed     HER2 by Immunohistochemistry  Negative (Score 1+)   Test Type  Food and Drug Administration (FDA) cleared (test / vendor): Roche Pathway anti-HER-2/stanislav (4B5) Rabbit Monoclonal Primary Antibody, Roche Multimer Detection    Primary Antibody  4B5   Cold Ischemia and Fixation Times  Meet requirements specified in latest version of the ASCO / CAP Guidelines   Testing Performed on Block Number(s)  A1   METHODS   Fixative  Formalin   Image Analysis  Not performed   .              Assessment/Plan   Diagnoses and all orders for this visit:  Infiltrating ductal carcinoma of right breast    65-year-old female with right breast invasive ductal carcinoma, grade 2, with DCIS and foci suspicious for lymphovascular invasion.  ER 91 to 100%, LA 81 to 90%, HER2 negative.  On imaging the tumor is 1.0 cm diameter and is located at the 9 o'clock position 5 cm from the nipple.  Normal axilla by ultrasound.  Clinical T1 N0.  I discussed the results and treatment options with the patient and her .  I answered multiple questions.  I discussed mastectomy versus lumpectomy with radiation. I also discussed sentinel lymph node biopsy and axillary lymph node dissection. I discussed the procedures and risks with the patient. The risks include bleeding, infection, injury to surrounding structures such as nerves/blood vessels, cardiac/pulmonary complications, chronic lymphedema. I discussed the possibility of requiring a second operation following lumpectomy or sentinel lymph node biopsy due to positive margins or positive lymph nodes found on the final pathology report. I discussed the possibility of breast reconstruction and offered to refer the patient to a plastic surgeon.  The patient is uncertain whether to have lumpectomy versus mastectomy.  I told her that given the small size of her tumor, lumpectomy with sentinel lymph node biopsy would be reasonable.  She will consider her options and notify me of her decision.  She had questions regarding BRCA testing and I told her that I could refer her to a genetic counselor for discussion if she desires.    Constantino Suarez MD           [1]  Family History  Problem Relation Name Age of Onset   • Breast  cancer Mother Eleanor    • Brain Aneurysm Father

## 2025-06-27 NOTE — PROGRESS NOTES
[facility]                                                       Dr. Murillo, Dr. Porter, Dr. Bustillo, Dr. Ocasio, Dr. Lugo, Dr. Clinton                                      [facility], [facility], [address]                                                 [address] [address]                                      Ph: [phone]  Fax: [phone]                                         Ph: [phone]  Fax: [phone]      Patient is a 61y old  Female who presents with a chief complaint of Failure to thrive/Hyponatremia (09 Dec 2024 12:27)  Patient seen in follow up for hyponatremia.        PAST MEDICAL HISTORY:  Lupus    Sjogren's disease    Vertigo    UTI (urinary tract infection)    GERD (gastroesophageal reflux disease)    Hypotension    Hypothyroidism    Gallstones    Hepatitis C    Anxiety    Migraine    Neuropathy    COPD (chronic obstructive pulmonary disease)    Nicotine addiction    Glossopharyngeal neuralgia syndrome    Dvt femoral (deep venous thrombosis)    Lupus    Emphysema/COPD    Burning mouth syndrome    H/O osteoporosis    History of weight loss    History of seizure disorder    Personal history of skin cancer    Osteochondritis dissecans    History of IBS    H/O Raynaud's syndrome    Pain, wrist, right    Right shoulder pain    Chronic low back pain with sciatica    Chronic neck pain    H/O paroxysmal supraventricular tachycardia      MEDICATIONS  (STANDING):  enoxaparin Injectable 40 milliGRAM(s) SubCutaneous every 24 hours  influenza   Vaccine 0.5 milliLiter(s) IntraMuscular once  levothyroxine 25 MICROGram(s) Oral daily  lidocaine   4% Patch 1 Patch Transdermal daily  naloxone Injectable 0.4 milliGRAM(s) IV Push once  pantoprazole    Tablet 40 milliGRAM(s) Oral before breakfast  vancomycin    Solution 125 milliGRAM(s) Oral every 6 hours    MEDICATIONS  (PRN):  acetaminophen     Tablet .. 650 milliGRAM(s) Oral every 6 hours PRN Mild Pain (1 - 3)  albuterol    90 MICROgram(s) HFA Inhaler 2 Puff(s) Inhalation every 6 hours PRN Shortness of Breath and/or Wheezing  aluminum hydroxide/magnesium hydroxide/simethicone Suspension 30 milliLiter(s) Oral every 4 hours PRN Upset Stomach  melatonin 5 milliGRAM(s) Oral at bedtime PRN Insomnia  ondansetron Injectable 4 milliGRAM(s) IV Push every 8 hours PRN Nausea and/or Vomiting    T(C): 37.2 (12-12-24 @ 12:25), Max: 37.2 (12-12-24 @ 04:53)  HR: 76 (12-12-24 @ 12:25) (69 - 96)  BP: 110/76 (12-12-24 @ 12:25) (100/64 - 136/73)  RR: 18 (12-12-24 @ 12:25)  SpO2: 96% (12-12-24 @ 12:25)  Wt(kg): --  I&O's Detail    11 Dec 2024 07:01  -  12 Dec 2024 07:00  --------------------------------------------------------  IN:    dextrose 5%: 350 mL  Total IN: 350 mL    OUT:  Total OUT: 0 mL    Total NET: 350 mL                PHYSICAL EXAM:  General: No distress  Respiratory: b/l air entry  Cardiovascular: S1 S2  Gastrointestinal: soft  Extremities:  no edema        LABORATORY:                        13.3   12.16 )-----------( 519      ( 12 Dec 2024 07:20 )             35.9     12-12    128[L]  |  96  |  9   ----------------------------<  89  4.4   |  22  |  0.51    Ca    9.0      12 Dec 2024 07:20  Phos  3.7     12-10  Mg     2.4     12-10    TPro  7.1  /  Alb  3.9  /  TBili  0.8  /  DBili  x   /  AST  25  /  ALT  33  /  AlkPhos  151[H]  12-10    Sodium: 128 mmol/L (12-12 @ 07:20)  Sodium: 126 mmol/L (12-11 @ 12:55)  Sodium: 129 mmol/L (12-11 @ 04:25)  Sodium: 128 mmol/L (12-11 @ 00:56)    Potassium: 4.4 mmol/L (12-12 @ 07:20)  Potassium: 3.4 mmol/L (12-11 @ 12:55)  Potassium: 3.7 mmol/L (12-11 @ 04:25)  Potassium: 3.6 mmol/L (12-11 @ 00:56)    Hemoglobin: 13.3 g/dL (12-12 @ 07:20)  Hemoglobin: 14.1 g/dL (12-10 @ 19:42)  Hemoglobin: 12.8 g/dL (12-10 @ 07:36)    Creatinine, Serum 0.51 (12-12 @ 07:20)  Creatinine, Serum 0.95 (12-11 @ 12:55)  Creatinine, Serum 0.72 (12-11 @ 04:25)  Creatinine, Serum 0.60 (12-11 @ 00:56)        LIVER FUNCTIONS - ( 10 Dec 2024 19:42 )  Alb: 3.9 g/dL / Pro: 7.1 g/dL / ALK PHOS: 151 U/L / ALT: 33 U/L / AST: 25 U/L / GGT: x           Urinalysis Basic - ( 12 Dec 2024 07:20 )    Color: x / Appearance: x / SG: x / pH: x  Gluc: 89 mg/dL / Ketone: x  / Bili: x / Urobili: x   Blood: x / Protein: x / Nitrite: x   Leuk Esterase: x / RBC: x / WBC x   Sq Epi: x / Non Sq Epi: x / Bacteria: x                           "History Of Present Illness  HPI   June 6, 2025  The patient is a 65-year-old female who had a recent abnormal mammogram.  She has not felt any breast lumps and has no breast pain.  No nipple discharge or retraction.  No previous breast biopsy.  Age of menarche was 13.  G2, P2.  Age of first childbirth was 29.  She had a hysterectomy in 2009 for benign disease.  Family history: Mother had breast cancer.  No other cancer runs in the family.    June 27, 2025  The patient follows up after having an ultrasound-guided core biopsy of the right breast.  No complaints regarding the biopsy.  She reports that her maternal grandfather had prostate cancer.  Uncertain whether it was metastatic.    Past medical history:  Hypertension  Osteoarthritis  Hypothyroid  Depression and anxiety  Left shoulder arthroscopy in January 2025  Detached retina  Left midfoot fusion in 2017  Hemithyroidectomy for benign disease     Past Medical History  She has a past medical history of Anxiety, Arthritis, Hyperlipidemia, Hypertension, Hypothyroidism, Joint pain, and PONV (postoperative nausea and vomiting).    Surgical History  She has a past surgical history that includes Hysterectomy (2009); Oophorectomy (2009); Thyroid surgery; Colonoscopy; Foot surgery; and Breast biopsy (N/A, 1976).     Allergies  Patient has no known allergies.    Social History  She reports that she has never smoked. She has never used smokeless tobacco. She reports current alcohol use. She reports that she does not use drugs.    Family History  Family History[1]      Last Recorded Vitals  Blood pressure 150/88, pulse 101, temperature 36.7 °C (98.1 °F), resp. rate 18, height 1.575 m (5' 2\"), weight 86.2 kg (190 lb), SpO2 97%.    Physical Exam   Constitutional: Well-developed, well-nourished, alert and oriented, no acute distress  Skin: Warm and dry, no lesions, no rashes, no jaundice  HEENT: Normocephalic, atraumatic, EOMI, no scleral icterus, mucous membranes moist, no " lesions seen  Neck: Soft, nontender, no mass or adenopathy  Cardiac: Regular rate and rhythm, no murmur  Chest: Patent airway, clear to auscultation, normal breath sounds with good chest expansion, no wheezes or rales or rhonchi noted, thorax symmetric  Breast:     right breast: Mild ecchymosis laterally, no other skin changes, nontender, no mass    left breast: No skin changes, nontender, no mass, mild fibrocystic change  Abdomen: Nondistended, soft, nontender, no mass  Rectal: Not performed  Extremities: No injury, no lower extremity edema or calf tenderness  Lymphatic: No cervical or axillary adenopathy  Musculoskeletal: Range of motion intact, no joint swelling, normal strength  Neurological: Alert and oriented x3, intact sensory and motor function, no obvious focal neurologic abnormalities  Psychological: Appropriate mood and behavior  Examination chaperoned by Ana    Relevant Results  Surgical Pathology Exam: [id number]  Order: [id number]   Collected 6/16/2025 11:10       Status: Final result       Dx: Abnormal finding on breast imaging    Test Result Released: Yes (seen)    0 Result Notes       Component  Resulting Agency   FINAL DIAGNOSIS   A. Right breast mass, 9:00 5 cm from nipple, ultrasound guided core biopsy:   -- Invasive ductal carcinoma, grade 2, see note.  -- Ductal carcinoma in situ, intermediate nuclear grade, solid, cribriform patterns.  -- Foci suspicious for lymphovascular invasion are present.      Note:  In this limited sample, the invasive carcinoma measures up to 8 mm in greatest dimension.  Immunohistochemical stain for e-cadherin is strongly membranous positive, supporting ductal phenotype.  ER, NC and HER2 will be reported in a synoptic report.       Electronically signed by Brianna Dixon MD on 6/20/2025 at [address] [facility]   By the signature on this report, the individual or group listed as making the Final Interpretation/Diagnosis certifies that they have reviewed this case.     Comment  [facility]   The case was reviewed at Breast Consensus Conference via Zoom with concordance.   Case Summary Report   Breast Biomarker Reporting Template   Protocol posted: 12/13/2023BREAST BIOMARKER REPORTING TEMPLATE - All Specimens  Test(s) Performed     Estrogen Receptor (ER) Status  Positive (greater than 10% of cells demonstrate nuclear positivity)   Percentage of Cells with Nuclear Positivity  %   Average Intensity of Staining  Strong   Test Type  Food and Drug Administration (FDA) cleared (test / vendor): Roche CONFIRM anti-(ER) (SP1) Rabbit Monoclonal Primary Antibody, Roche Multimer Detection   Primary Antibody  SP1   Scoring System  No separate scoring system used   Test(s) Performed     Progesterone Receptor (PgR) Status  Positive   Percentage of Cells with Nuclear Positivity  81-90%   Average Intensity of Staining  Moderate   Test Type  Food and Drug Administration (FDA) cleared (test / vendor): Roche CONFIRM anti-(AR) (1E2) Rabbit Monoclonal Primary Anitbody, Roche Multimer Detection   Primary Antibody  1E2   Scoring System  No separate scoring system used   Test(s) Performed     HER2 by Immunohistochemistry  Negative (Score 1+)   Test Type  Food and Drug Administration (FDA) cleared (test / vendor): Roche Pathway anti-HER-2/stanislav (4B5) Rabbit Monoclonal Primary Antibody, Roche Multimer Detection   Primary Antibody  4B5   Cold Ischemia and Fixation Times  Meet requirements specified in latest version of the ASCO / CAP Guidelines   Testing Performed on Block Number(s)  A1   METHODS   Fixative  Formalin   Image Analysis  Not performed   .              Assessment/Plan   Diagnoses and all orders for this visit:  Infiltrating ductal carcinoma of right breast    65-year-old female with right breast invasive ductal carcinoma, grade 2, with DCIS and foci suspicious for lymphovascular invasion.  ER 91 to 100%, AR 81 to 90%, HER2 negative.  On imaging the tumor is 1.0 cm diameter and is located at the 9  o'clock position 5 cm from the nipple.  Normal axilla by ultrasound.  Clinical T1 N0.  I discussed the results and treatment options with the patient and her .  I answered multiple questions.  I discussed mastectomy versus lumpectomy with radiation. I also discussed sentinel lymph node biopsy and axillary lymph node dissection. I discussed the procedures and risks with the patient. The risks include bleeding, infection, injury to surrounding structures such as nerves/blood vessels, cardiac/pulmonary complications, chronic lymphedema. I discussed the possibility of requiring a second operation following lumpectomy or sentinel lymph node biopsy due to positive margins or positive lymph nodes found on the final pathology report. I discussed the possibility of breast reconstruction and offered to refer the patient to a plastic surgeon.  The patient is uncertain whether to have lumpectomy versus mastectomy.  I told her that given the small size of her tumor, lumpectomy with sentinel lymph node biopsy would be reasonable.  She will consider her options and notify me of her decision.  She had questions regarding BRCA testing and I told her that I could refer her to a genetic counselor for discussion if she desires.    Constantino Suarez MD         [1]   Family History  Problem Relation Name Age of Onset    Breast cancer Mother Eleanor     Brain Aneurysm Father

## 2025-06-27 NOTE — Clinical Note
June 27, 2025     No Recipients    Patient: Nancy Marcus   YOB: 1960   Date of Visit: 6/27/2025       Dear Dr. Michel Recipients:    Thank you for referring Nancy Marcus to me for evaluation. Below are my notes for this consultation.  If you have questions, please do not hesitate to call me. I look forward to following your patient along with you.       Sincerely,     Constantino Suarez MD      CC: No Recipients  ______________________________________________________________________________________    History Of Present Illness  HPI   June 6, 2025  The patient is a 65-year-old female who had a recent abnormal mammogram.  She has not felt any breast lumps and has no breast pain.  No nipple discharge or retraction.  No previous breast biopsy.  Age of menarche was 13.  G2, P2.  Age of first childbirth was 29.  She had a hysterectomy in 2009 for benign disease.  Family history: Mother had breast cancer.  No other cancer runs in the family.    June 27, 2025  The patient follows up after having an ultrasound-guided core biopsy of the right breast.  No complaints regarding the biopsy.  She reports that her maternal grandfather had prostate cancer.  Uncertain whether it was metastatic.    Past medical history:  Hypertension  Osteoarthritis  Hypothyroid  Depression and anxiety  Left shoulder arthroscopy in January 2025  Detached retina  Left midfoot fusion in 2017  Hemithyroidectomy for benign disease     Past Medical History  She has a past medical history of Anxiety, Arthritis, Hyperlipidemia, Hypertension, Hypothyroidism, Joint pain, and PONV (postoperative nausea and vomiting).    Surgical History  She has a past surgical history that includes Hysterectomy (2009); Oophorectomy (2009); Thyroid surgery; Colonoscopy; Foot surgery; and Breast biopsy (N/A, 1976).     Allergies  Patient has no known allergies.    Social History  She reports that she has never smoked. She has never used smokeless tobacco. She  "reports current alcohol use. She reports that she does not use drugs.    Family History  Family History[1]      Last Recorded Vitals  Blood pressure 150/88, pulse 101, temperature 36.7 °C (98.1 °F), resp. rate 18, height 1.575 m (5' 2\"), weight 86.2 kg (190 lb), SpO2 97%.    Physical Exam   Constitutional: Well-developed, well-nourished, alert and oriented, no acute distress  Skin: Warm and dry, no lesions, no rashes, no jaundice  HEENT: Normocephalic, atraumatic, EOMI, no scleral icterus, mucous membranes moist, no lesions seen  Neck: Soft, nontender, no mass or adenopathy  Cardiac: Regular rate and rhythm, no murmur  Chest: Patent airway, clear to auscultation, normal breath sounds with good chest expansion, no wheezes or rales or rhonchi noted, thorax symmetric  Breast:     right breast: Mild ecchymosis laterally, no other skin changes, nontender, no mass    left breast: No skin changes, nontender, no mass, mild fibrocystic change  Abdomen: Nondistended, soft, nontender, no mass  Rectal: Not performed  Extremities: No injury, no lower extremity edema or calf tenderness  Lymphatic: No cervical or axillary adenopathy  Musculoskeletal: Range of motion intact, no joint swelling, normal strength  Neurological: Alert and oriented x3, intact sensory and motor function, no obvious focal neurologic abnormalities  Psychological: Appropriate mood and behavior  Examination chaperoned by Ana    Relevant Results  Surgical Pathology Exam: [id number]  Order: [id number]   Collected 6/16/2025 11:10       Status: Final result       Dx: Abnormal finding on breast imaging    Test Result Released: Yes (seen)    0 Result Notes       Component  Resulting Agency   FINAL DIAGNOSIS   A. Right breast mass, 9:00 5 cm from nipple, ultrasound guided core biopsy:   -- Invasive ductal carcinoma, grade 2, see note.  -- Ductal carcinoma in situ, intermediate nuclear grade, solid, cribriform patterns.  -- Foci suspicious for lymphovascular " invasion are present.      Note:  In this limited sample, the invasive carcinoma measures up to 8 mm in greatest dimension.  Immunohistochemical stain for e-cadherin is strongly membranous positive, supporting ductal phenotype.  ER, RI and HER2 will be reported in a synoptic report.       Electronically signed by Brianna Dixon MD on 6/20/2025 at 1108 EDT      [facility]   By the signature on this report, the individual or group listed as making the Final Interpretation/Diagnosis certifies that they have reviewed this case.    Comment  [facility]   The case was reviewed at Breast Consensus Conference via Zoom with concordance.   Case Summary Report   Breast Biomarker Reporting Template   Protocol posted: 12/13/2023BREAST BIOMARKER REPORTING TEMPLATE - All Specimens  Test(s) Performed     Estrogen Receptor (ER) Status  Positive (greater than 10% of cells demonstrate nuclear positivity)   Percentage of Cells with Nuclear Positivity  %   Average Intensity of Staining  Strong   Test Type  Food and Drug Administration (FDA) cleared (test / vendor): Roche CONFIRM anti-(ER) (SP1) Rabbit Monoclonal Primary Antibody, Roche Multimer Detection   Primary Antibody  SP1   Scoring System  No separate scoring system used   Test(s) Performed     Progesterone Receptor (PgR) Status  Positive   Percentage of Cells with Nuclear Positivity  81-90%   Average Intensity of Staining  Moderate   Test Type  Food and Drug Administration (FDA) cleared (test / vendor): Roche CONFIRM anti-(RI) (1E2) Rabbit Monoclonal Primary Anitbody, Roche Multimer Detection   Primary Antibody  1E2   Scoring System  No separate scoring system used   Test(s) Performed     HER2 by Immunohistochemistry  Negative (Score 1+)   Test Type  Food and Drug Administration (FDA) cleared (test / vendor): Roche Pathway anti-HER-2/stanislav (4B5) Rabbit Monoclonal Primary Antibody, Roche Multimer Detection   Primary Antibody  4B5   Cold Ischemia and Fixation Times  Meet requirements  specified in latest version of the ASCO / CAP Guidelines   Testing Performed on Block Number(s)  A1   METHODS   Fixative  Formalin   Image Analysis  Not performed   .              Assessment/Plan   Diagnoses and all orders for this visit:  Infiltrating ductal carcinoma of right breast    65-year-old female with right breast invasive ductal carcinoma, grade 2, with DCIS and foci suspicious for lymphovascular invasion.  ER 91 to 100%, MS 81 to 90%, HER2 negative.  On imaging the tumor is 1.0 cm diameter and is located at the 9 o'clock position 5 cm from the nipple.  Normal axilla by ultrasound.  Clinical T1 N0.  I discussed the results and treatment options with the patient and her .  I answered multiple questions.  I discussed mastectomy versus lumpectomy with radiation. I also discussed sentinel lymph node biopsy and axillary lymph node dissection. I discussed the procedures and risks with the patient. The risks include bleeding, infection, injury to surrounding structures such as nerves/blood vessels, cardiac/pulmonary complications, chronic lymphedema. I discussed the possibility of requiring a second operation following lumpectomy or sentinel lymph node biopsy due to positive margins or positive lymph nodes found on the final pathology report. I discussed the possibility of breast reconstruction and offered to refer the patient to a plastic surgeon.  The patient is uncertain whether to have lumpectomy versus mastectomy.  I told her that given the small size of her tumor, lumpectomy with sentinel lymph node biopsy would be reasonable.  She will consider her options and notify me of her decision.  She had questions regarding BRCA testing and I told her that I could refer her to a genetic counselor for discussion if she desires.    Constantino Suarez MD         [1]   Family History  Problem Relation Name Age of Onset    Breast cancer Mother Eleanor     Brain Aneurysm Father

## 2025-07-02 DIAGNOSIS — C50.911 INFILTRATING DUCTAL CARCINOMA OF RIGHT BREAST: ICD-10-CM

## 2025-07-15 ENCOUNTER — HOSPITAL ENCOUNTER (OUTPATIENT)
Dept: RADIOLOGY | Facility: CLINIC | Age: 65
Discharge: HOME | End: 2025-07-15
Payer: MEDICARE

## 2025-07-15 DIAGNOSIS — C50.911 INFILTRATING DUCTAL CARCINOMA OF RIGHT BREAST: ICD-10-CM

## 2025-07-15 PROCEDURE — C1739 HC OR 278 NO HCPCS: HCPCS

## 2025-07-15 PROCEDURE — 19285 PERQ DEV BREAST 1ST US IMAG: CPT | Mod: RT

## 2025-07-15 PROCEDURE — 77065 DX MAMMO INCL CAD UNI: CPT | Mod: RIGHT SIDE | Performed by: RADIOLOGY

## 2025-07-15 PROCEDURE — 2780000003 HC OR 278 NO HCPCS

## 2025-07-16 NOTE — PROGRESS NOTES
History Of Present Illness  HPI    June 6, 2025  The patient is a 65-year-old female who had a recent abnormal mammogram.  She has not felt any breast lumps and has no breast pain.  No nipple discharge or retraction.  No previous breast biopsy.  Age of menarche was 13.  G2, P2.  Age of first childbirth was 29.  She had a hysterectomy in 2009 for benign disease.  Family history: Mother had breast cancer.  No other cancer runs in the family.     July 18, 2025  65-year-old female with right breast invasive ductal carcinoma, grade 2, with DCIS and foci suspicious for lymphovascular invasion.  ER 91 to 100%, FL 81 to 90%, HER2 negative.  On imaging the tumor is 1.0 cm diameter and is located at the 9 o'clock position 5 cm from the nipple.  Normal axilla by ultrasound.  Clinical T1 N0.  The patient wishes to have right breast Magseed localization lumpectomy with sentinel lymph node biopsy.  She reports that her maternal grandfather had prostate cancer.  She is uncertain whether it was metastatic or high-grade.  A maternal great aunt had breast cancer.     Past medical history:  Hypertension  Osteoarthritis  Hypothyroid  Depression and anxiety  Left shoulder arthroscopy in January 2025  Detached retina  Left midfoot fusion in 2017  Hemithyroidectomy for benign disease    Past Medical History  She has a past medical history of Anxiety (?1995), Arthritis (?2010), Cataract ([id number]), Depression (?1995), GERD (gastroesophageal reflux disease) (history of), Hyperlipidemia (2024), Hypertension (2023), Hypothyroidism, Joint pain, Obesity (?1992), PONV (postoperative nausea and vomiting), Urinary tract infection (history of), Vertigo (history of), and Vision loss (2015?).    Surgical History  She has a past surgical history that includes Hysterectomy (2009); Oophorectomy (2009); Thyroid surgery; Colonoscopy; Foot surgery; and Breast biopsy (N/A, 1976).     Allergies  Patient has no known allergies.    Social History  She reports  "that she has never smoked. She has never used smokeless tobacco. She reports that she does not currently use alcohol. She reports that she does not use drugs.    Family History  Family History[1]    Last Recorded Vitals  Blood pressure (!) 136/94, pulse 91, temperature 36.5 °C (97.7 °F), resp. rate 18, height 1.575 m (5' 2\"), weight 84.8 kg (187 lb), SpO2 98%.    Physical Exam   Constitutional: Well-developed, well-nourished, alert and oriented, no acute distress  Skin: Warm and dry, no lesions, no rashes, no jaundice  HEENT: Normocephalic, atraumatic, EOMI, no scleral icterus, mucous membranes moist, no lesions seen  Neck: Soft, nontender, no mass or adenopathy  Cardiac: Regular rate and rhythm, no murmur  Chest: Patent airway, clear to auscultation, normal breath sounds with good chest expansion, no wheezes or rales or rhonchi noted, thorax symmetric  Breast:     right breast: Mild ecchymosis laterally, no other skin changes, nontender, no mass    left breast: No skin changes, nontender, no mass, mild fibrocystic change  Abdomen: Nondistended, soft, nontender, no mass  Rectal: Not performed  Extremities: No injury, no lower extremity edema or calf tenderness  Lymphatic: No cervical or axillary adenopathy  Musculoskeletal: Range of motion intact, no joint swelling, normal strength  Neurological: Alert and oriented x3, intact sensory and motor function, no obvious focal neurologic abnormalities  Psychological: Appropriate mood and behavior  Examination chaperoned by Sara    Relevant Results       Assessment/Plan   Diagnoses and all orders for this visit:  Infiltrating ductal carcinoma of right breast    65-year-old female with right breast invasive ductal carcinoma, grade 2, with DCIS and foci suspicious for lymphovascular invasion.  ER 91 to 100%, [place] 81 to 90%, HER2 negative.  On imaging the tumor is 1.0 cm diameter and is located at the 9 o'clock position 5 cm from the nipple.  Normal axilla by " ultrasound.  Clinical T1 N0.  I have discussed the results and treatment options with the patient and her .  I answered multiple questions.  I have discussed mastectomy versus lumpectomy with radiation. I also discussed sentinel lymph node biopsy and axillary lymph node dissection. I discussed the procedures and risks with the patient. The risks include bleeding, infection, injury to surrounding structures such as nerves/blood vessels, cardiac/pulmonary complications, chronic lymphedema. I discussed the possibility of requiring a second operation following lumpectomy or sentinel lymph node biopsy due to positive margins or positive lymph nodes found on the final pathology report. I discussed the possibility of breast reconstruction and offered to refer the patient to a plastic surgeon.    She had questions regarding BRCA testing and I told her that I could refer her to a genetic counselor for discussion if she desires.     The patient wishes to have Magseed localization lumpectomy with sentinel lymph node biopsy.  Electronic consent completed    Constantino Suarez MD         [1]   Family History  Problem Relation Name Age of Onset    Breast cancer Mother Eleanor     Brain Aneurysm Father      Cancer Mother Eleanor 62    Stroke Father Kyle     Hyperlipidemia Father [place]     Arthritis Father [place]     Brain Aneurysm Father [place]     Hypertension Brother three brothers     Stroke Father Ed     Hyperlipidemia Father Ed     Arthritis Father Ed     Breast cancer Mother Eleanor     Miscarriages / Stillbirths Daughter Pema

## 2025-07-18 ENCOUNTER — OFFICE VISIT (OUTPATIENT)
Dept: SURGERY | Facility: CLINIC | Age: 65
End: 2025-07-18
Payer: MEDICARE

## 2025-07-18 ENCOUNTER — LAB (OUTPATIENT)
Dept: LAB | Facility: HOSPITAL | Age: 65
End: 2025-07-18
Payer: MEDICARE

## 2025-07-18 ENCOUNTER — HOSPITAL ENCOUNTER (OUTPATIENT)
Dept: RADIOLOGY | Facility: HOSPITAL | Age: 65
Discharge: HOME | End: 2025-07-18
Payer: MEDICARE

## 2025-07-18 VITALS
BODY MASS INDEX: 34.41 KG/M2 | HEIGHT: 62 IN | RESPIRATION RATE: 18 BRPM | DIASTOLIC BLOOD PRESSURE: 94 MMHG | WEIGHT: 187 LBS | HEART RATE: 91 BPM | OXYGEN SATURATION: 98 % | TEMPERATURE: 97.7 F | SYSTOLIC BLOOD PRESSURE: 136 MMHG

## 2025-07-18 DIAGNOSIS — C50.911 MALIGNANT NEOPLASM OF UNSPECIFIED SITE OF RIGHT FEMALE BREAST: Primary | ICD-10-CM

## 2025-07-18 DIAGNOSIS — C50.911 INFILTRATING DUCTAL CARCINOMA OF RIGHT BREAST: ICD-10-CM

## 2025-07-18 DIAGNOSIS — C50.911 INFILTRATING DUCTAL CARCINOMA OF RIGHT BREAST: Primary | ICD-10-CM

## 2025-07-18 LAB
ALBUMIN SERPL BCP-MCNC: 4.8 G/DL (ref 3.4–5)
ALP SERPL-CCNC: 84 U/L (ref 33–136)
ALT SERPL W P-5'-P-CCNC: 20 U/L (ref 7–45)
ANION GAP SERPL CALC-SCNC: 14 MMOL/L (ref 10–20)
AST SERPL W P-5'-P-CCNC: 24 U/L (ref 9–39)
BILIRUB SERPL-MCNC: 0.5 MG/DL (ref 0–1.2)
BUN SERPL-MCNC: 13 MG/DL (ref 6–23)
CALCIUM SERPL-MCNC: 9.9 MG/DL (ref 8.6–10.3)
CHLORIDE SERPL-SCNC: 103 MMOL/L (ref 98–107)
CO2 SERPL-SCNC: 28 MMOL/L (ref 21–32)
CREAT SERPL-MCNC: 0.64 MG/DL (ref 0.5–1.05)
EGFRCR SERPLBLD CKD-EPI 2021: >90 ML/MIN/1.73M*2
ERYTHROCYTE [DISTWIDTH] IN BLOOD BY AUTOMATED COUNT: 13.2 % (ref 11.5–14.5)
GLUCOSE SERPL-MCNC: 88 MG/DL (ref 74–99)
HCT VFR BLD AUTO: 45.5 % (ref 36–46)
HGB BLD-MCNC: 14.5 G/DL (ref 12–16)
MCH RBC QN AUTO: 29.2 PG (ref 26–34)
MCHC RBC AUTO-ENTMCNC: 31.9 G/DL (ref 32–36)
MCV RBC AUTO: 92 FL (ref 80–100)
NRBC BLD-RTO: 0 /100 WBCS (ref 0–0)
PLATELET # BLD AUTO: 175 X10*3/UL (ref 150–450)
POTASSIUM SERPL-SCNC: 4.8 MMOL/L (ref 3.5–5.3)
PROT SERPL-MCNC: 7.2 G/DL (ref 6.4–8.2)
RBC # BLD AUTO: 4.97 X10*6/UL (ref 4–5.2)
SODIUM SERPL-SCNC: 140 MMOL/L (ref 136–145)
WBC # BLD AUTO: 10.7 X10*3/UL (ref 4.4–11.3)

## 2025-07-18 PROCEDURE — 36415 COLL VENOUS BLD VENIPUNCTURE: CPT

## 2025-07-18 PROCEDURE — 1126F AMNT PAIN NOTED NONE PRSNT: CPT | Performed by: SURGERY

## 2025-07-18 PROCEDURE — 3080F DIAST BP >= 90 MM HG: CPT | Performed by: SURGERY

## 2025-07-18 PROCEDURE — 71046 X-RAY EXAM CHEST 2 VIEWS: CPT

## 2025-07-18 PROCEDURE — 85027 COMPLETE CBC AUTOMATED: CPT

## 2025-07-18 PROCEDURE — 3075F SYST BP GE 130 - 139MM HG: CPT | Performed by: SURGERY

## 2025-07-18 PROCEDURE — 80053 COMPREHEN METABOLIC PANEL: CPT

## 2025-07-18 PROCEDURE — 1159F MED LIST DOCD IN RCRD: CPT | Performed by: SURGERY

## 2025-07-18 PROCEDURE — 3008F BODY MASS INDEX DOCD: CPT | Performed by: SURGERY

## 2025-07-18 PROCEDURE — 1036F TOBACCO NON-USER: CPT | Performed by: SURGERY

## 2025-07-18 PROCEDURE — 99213 OFFICE O/P EST LOW 20 MIN: CPT | Mod: 25 | Performed by: SURGERY

## 2025-07-18 PROCEDURE — 99213 OFFICE O/P EST LOW 20 MIN: CPT | Performed by: SURGERY

## 2025-07-18 ASSESSMENT — ENCOUNTER SYMPTOMS
DEPRESSION: 0
LOSS OF SENSATION IN FEET: 0
OCCASIONAL FEELINGS OF UNSTEADINESS: 0

## 2025-07-18 ASSESSMENT — PAIN SCALES - GENERAL: PAINLEVEL_OUTOF10: 0-NO PAIN

## 2025-07-18 NOTE — LETTER
July 18, 2025     Jey Badillo DO  [address]  [address]  [facility] [address]    Patient: Nancy Marcus   YOB: 1960   Date of Visit: 7/18/2025       Dear Dr. Jey Badillo DO:    Thank you for referring Nancy Marcus to me for evaluation. Below are my notes for this consultation.  If you have questions, please do not hesitate to call me. I look forward to following your patient along with you.       Sincerely,     Constantino Suarez MD      CC: No Recipients  ______________________________________________________________________________________    History Of Present Illness  HPI    June 6, 2025  The patient is a 65-year-old female who had a recent abnormal mammogram.  She has not felt any breast lumps and has no breast pain.  No nipple discharge or retraction.  No previous breast biopsy.  Age of menarche was 13.  G2, P2.  Age of first childbirth was 29.  She had a hysterectomy in 2009 for benign disease.  Family history: Mother had breast cancer.  No other cancer runs in the family.     July 18, 2025  65-year-old female with right breast invasive ductal carcinoma, grade 2, with DCIS and foci suspicious for lymphovascular invasion.  ER 91 to 100%, SC 81 to 90%, HER2 negative.  On imaging the tumor is 1.0 cm diameter and is located at the 9 o'clock position 5 cm from the nipple.  Normal axilla by ultrasound.  Clinical T1 N0.  The patient wishes to have right breast Magseed localization lumpectomy with sentinel lymph node biopsy.  She reports that her maternal grandfather had prostate cancer.  She is uncertain whether it was metastatic or high-grade.  A maternal great aunt had breast cancer.     Past medical history:  Hypertension  Osteoarthritis  Hypothyroid  Depression and anxiety  Left shoulder arthroscopy in January 2025  Detached retina  Left midfoot fusion in 2017  Hemithyroidectomy for benign disease    Past Medical History  She has a past medical history of Anxiety  "(?1995), Arthritis (?2010), Cataract ([id number]), Depression (?1995), GERD (gastroesophageal reflux disease) (history of), Hyperlipidemia (2024), Hypertension (2023), Hypothyroidism, Joint pain, Obesity (?1992), PONV (postoperative nausea and vomiting), Urinary tract infection (history of), Vertigo (history of), and Vision loss (2015?).    Surgical History  She has a past surgical history that includes Hysterectomy (2009); Oophorectomy (2009); Thyroid surgery; Colonoscopy; Foot surgery; and Breast biopsy (N/A, 1976).     Allergies  Patient has no known allergies.    Social History  She reports that she has never smoked. She has never used smokeless tobacco. She reports that she does not currently use alcohol. She reports that she does not use drugs.    Family History  Family History[1]    Last Recorded Vitals  Blood pressure (!) 136/94, pulse 91, temperature 36.5 °C (97.7 °F), resp. rate 18, height 1.575 m (5' 2\"), weight 84.8 kg (187 lb), SpO2 98%.    Physical Exam   Constitutional: Well-developed, well-nourished, alert and oriented, no acute distress  Skin: Warm and dry, no lesions, no rashes, no jaundice  HEENT: Normocephalic, atraumatic, EOMI, no scleral icterus, mucous membranes moist, no lesions seen  Neck: Soft, nontender, no mass or adenopathy  Cardiac: Regular rate and rhythm, no murmur  Chest: Patent airway, clear to auscultation, normal breath sounds with good chest expansion, no wheezes or rales or rhonchi noted, thorax symmetric  Breast:     right breast: Mild ecchymosis laterally, no other skin changes, nontender, no mass    left breast: No skin changes, nontender, no mass, mild fibrocystic change  Abdomen: Nondistended, soft, nontender, no mass  Rectal: Not performed  Extremities: No injury, no lower extremity edema or calf tenderness  Lymphatic: No cervical or axillary adenopathy  Musculoskeletal: Range of motion intact, no joint swelling, normal strength  Neurological: Alert and oriented x3, intact " sensory and motor function, no obvious focal neurologic abnormalities  Psychological: Appropriate mood and behavior  Examination chaperoned by Sara    Relevant Results       Assessment/Plan  Diagnoses and all orders for this visit:  Infiltrating ductal carcinoma of right breast    65-year-old female with right breast invasive ductal carcinoma, grade 2, with DCIS and foci suspicious for lymphovascular invasion.  ER 91 to 100%, [place] 81 to 90%, HER2 negative.  On imaging the tumor is 1.0 cm diameter and is located at the 9 o'clock position 5 cm from the nipple.  Normal axilla by ultrasound.  Clinical T1 N0.  I have discussed the results and treatment options with the patient and her .  I answered multiple questions.  I have discussed mastectomy versus lumpectomy with radiation. I also discussed sentinel lymph node biopsy and axillary lymph node dissection. I discussed the procedures and risks with the patient. The risks include bleeding, infection, injury to surrounding structures such as nerves/blood vessels, cardiac/pulmonary complications, chronic lymphedema. I discussed the possibility of requiring a second operation following lumpectomy or sentinel lymph node biopsy due to positive margins or positive lymph nodes found on the final pathology report. I discussed the possibility of breast reconstruction and offered to refer the patient to a plastic surgeon.    She had questions regarding BRCA testing and I told her that I could refer her to a genetic counselor for discussion if she desires.     The patient wishes to have Magseed localization lumpectomy with sentinel lymph node biopsy.  Electronic consent completed    Constantino Suarez MD         [1]  Family History  Problem Relation Name Age of Onset   • Breast cancer Mother Eleanor    • Brain Aneurysm Father     • Cancer Mother Eleanor 62   • Stroke Father Kyle    • Hyperlipidemia Father [place]    • Arthritis Father Kyle    • Brain Aneurysm Father Kyle    •  Hypertension Brother three brothers    • Stroke Father Ed    • Hyperlipidemia Father Ed    • Arthritis Father Ed    • Breast cancer Mother Eleanor    • Miscarriages / Stillbirths Daughter Pema         [1]  Family History  Problem Relation Name Age of Onset   • Breast cancer Mother Eleanor    • Brain Aneurysm Father     • Cancer Mother Eleanor 62   • Stroke Father Kyle    • Hyperlipidemia Father Kyle    • Arthritis Father Kyle    • Brain Aneurysm Father Kyle    • Hypertension Brother three brothers    • Stroke Father Ed    • Hyperlipidemia Father Ed    • Arthritis Father Ed    • Breast cancer Mother Eleanor    • Miscarriages / Stillbirths Daughter Pema

## 2025-07-24 NOTE — PREPROCEDURE INSTRUCTIONS
Current Medications    Medication Instructions    ALPRAZolam (Xanax) 0.5 mg tablet Continue until night before surgery if needed     cholecalciferol (Vitamin D-3) 50 mcg (2,000 unit) capsule Hold starting 7/25     cyclobenzaprine (Flexeril) 10 mg tablet Continue until night before surgery if needed     malachi prim-linoleic-gamolenic ac (Primrose Oil) 1,000 mg capsule Hold starting 7/25     levothyroxine (Synthroid, Levoxyl) 125 mcg tablet Take morning of surgery with sip of water    losartan (Cozaar) 50 mg tablet Hold the day of surgery     meloxicam (Mobic) 15 mg tablet Stop 7 days before surgery     multivitamin tablet Hold starting 7/25     psyllium (MetamuciL) 0.4 gram capsule Continue until night before surgery     rosuvastatin (Crestor) 10 mg tablet Take morning of surgery with sip of water    sertraline (Zoloft) 100 mg tablet Take morning of surgery with sip of water    turmeric-turmeric root extract 450-50 mg capsule Hold starting 7/25           NPO Instructions:    Do not eat any food after midnight the night before your surgery/procedure.  You may have 13 ounces of clear liquids until TWO hours before your ARRIVAL time to the hospital the day of your surgery/procedure. This includes water, black tea/coffee, (no milk or cream) apple juice and electrolyte drinks (Gatorade-no red colors).  You may chew gum up to TWO hours before your surgery/procedure.    Additional Instructions:     The day before your surgery, you will be receiving a phone call from the surgery schedulers with your times for surgery. If you don't receive a call by 2pm, please call [phone].    Enter through the main entrance of [facility], located at [address] [facility]. Proceed to registration, located on the right hand side of the staircase. You will need your ID and insurance card for registration. Please ensure you have a responsible adult to drive you home. A responsible adult DOES NOT include rideshare service drivers  (Uber, Lyft, etc), cab drivers, or public transportation drivers, but “provide a ride” is acceptable.    Take a shower before your procedure. After your shower avoid lotions, powders, deodorants or anything applied to the skin. If you wear contacts or glasses, wear the glasses. If you do not have glasses, please bring a case for your contacts. You may wear hearing aids and dentures, bring a case for them or we will provide one. Make sure you wear something loose and comfortable. Keep in mind your surgical procedure and wear something that will accommodate incisions or bandages. Please remove all jewelry and piercing's.     For further questions Janay MONTES can be contacted at [phone] between 7AM-3PM.

## 2025-07-29 ENCOUNTER — ANESTHESIA EVENT (OUTPATIENT)
Dept: OPERATING ROOM | Facility: HOSPITAL | Age: 65
End: 2025-07-29
Payer: MEDICARE

## 2025-07-29 ENCOUNTER — ANESTHESIA (OUTPATIENT)
Dept: OPERATING ROOM | Facility: HOSPITAL | Age: 65
End: 2025-07-29
Payer: MEDICARE

## 2025-07-29 ENCOUNTER — HOSPITAL ENCOUNTER (OUTPATIENT)
Facility: HOSPITAL | Age: 65
Setting detail: OUTPATIENT SURGERY
Discharge: HOME | End: 2025-07-29
Attending: SURGERY | Admitting: SURGERY
Payer: MEDICARE

## 2025-07-29 ENCOUNTER — APPOINTMENT (OUTPATIENT)
Dept: RADIOLOGY | Facility: HOSPITAL | Age: 65
End: 2025-07-29
Payer: MEDICARE

## 2025-07-29 ENCOUNTER — HOSPITAL ENCOUNTER (OUTPATIENT)
Dept: RADIOLOGY | Facility: HOSPITAL | Age: 65
Discharge: HOME | End: 2025-07-29
Payer: MEDICARE

## 2025-07-29 VITALS
SYSTOLIC BLOOD PRESSURE: 134 MMHG | OXYGEN SATURATION: 95 % | WEIGHT: 190.04 LBS | TEMPERATURE: 97 F | DIASTOLIC BLOOD PRESSURE: 83 MMHG | RESPIRATION RATE: 14 BRPM | BODY MASS INDEX: 34.97 KG/M2 | HEART RATE: 88 BPM | HEIGHT: 62 IN

## 2025-07-29 DIAGNOSIS — C50.911 INFILTRATING DUCTAL CARCINOMA OF RIGHT BREAST: Primary | ICD-10-CM

## 2025-07-29 DIAGNOSIS — C50.911 INFILTRATING DUCTAL CARCINOMA OF RIGHT BREAST: ICD-10-CM

## 2025-07-29 PROBLEM — Z98.890 PONV (POSTOPERATIVE NAUSEA AND VOMITING): Status: ACTIVE | Noted: 2025-07-29

## 2025-07-29 PROBLEM — R11.2 PONV (POSTOPERATIVE NAUSEA AND VOMITING): Status: ACTIVE | Noted: 2025-07-29

## 2025-07-29 PROCEDURE — 2500000004 HC RX 250 GENERAL PHARMACY W/ HCPCS (ALT 636 FOR OP/ED): Performed by: ANESTHESIOLOGY

## 2025-07-29 PROCEDURE — 3600000009 HC OR TIME - EACH INCREMENTAL 1 MINUTE - PROCEDURE LEVEL FOUR: Performed by: SURGERY

## 2025-07-29 PROCEDURE — 3430000001 HC RX 343 DIAGNOSTIC RADIOPHARMACEUTICALS: Performed by: SURGERY

## 2025-07-29 PROCEDURE — 19301 PARTIAL MASTECTOMY: CPT | Performed by: SURGERY

## 2025-07-29 PROCEDURE — A38525 PR BX/REMV,LYMPH NODE,DEEP AXILL

## 2025-07-29 PROCEDURE — 38525 BIOPSY/REMOVAL LYMPH NODES: CPT | Performed by: SURGERY

## 2025-07-29 PROCEDURE — 96372 THER/PROPH/DIAG INJ SC/IM: CPT | Performed by: SURGERY

## 2025-07-29 PROCEDURE — 2500000005 HC RX 250 GENERAL PHARMACY W/O HCPCS: Performed by: SURGERY

## 2025-07-29 PROCEDURE — 2500000002 HC RX 250 W HCPCS SELF ADMINISTERED DRUGS (ALT 637 FOR MEDICARE OP, ALT 636 FOR OP/ED): Performed by: ANESTHESIOLOGY

## 2025-07-29 PROCEDURE — 88307 TISSUE EXAM BY PATHOLOGIST: CPT | Performed by: PATHOLOGY

## 2025-07-29 PROCEDURE — 7100000009 HC PHASE TWO TIME - INITIAL BASE CHARGE: Performed by: SURGERY

## 2025-07-29 PROCEDURE — 7100000001 HC RECOVERY ROOM TIME - INITIAL BASE CHARGE: Performed by: SURGERY

## 2025-07-29 PROCEDURE — 7100000002 HC RECOVERY ROOM TIME - EACH INCREMENTAL 1 MINUTE: Performed by: SURGERY

## 2025-07-29 PROCEDURE — 3700000002 HC GENERAL ANESTHESIA TIME - EACH INCREMENTAL 1 MINUTE: Performed by: SURGERY

## 2025-07-29 PROCEDURE — 2500000004 HC RX 250 GENERAL PHARMACY W/ HCPCS (ALT 636 FOR OP/ED): Mod: JW | Performed by: SURGERY

## 2025-07-29 PROCEDURE — 76098 X-RAY EXAM SURGICAL SPECIMEN: CPT

## 2025-07-29 PROCEDURE — 38792 RA TRACER ID OF SENTINL NODE: CPT

## 2025-07-29 PROCEDURE — A9520 TC99 TILMANOCEPT DIAG 0.5MCI: HCPCS | Performed by: SURGERY

## 2025-07-29 PROCEDURE — 38792 RA TRACER ID OF SENTINL NODE: CPT | Performed by: SURGERY

## 2025-07-29 PROCEDURE — 88360 TUMOR IMMUNOHISTOCHEM/MANUAL: CPT | Performed by: PATHOLOGY

## 2025-07-29 PROCEDURE — A38525 PR BX/REMV,LYMPH NODE,DEEP AXILL: Performed by: ANESTHESIOLOGY

## 2025-07-29 PROCEDURE — 88307 TISSUE EXAM BY PATHOLOGIST: CPT | Mod: TC,PARLAB | Performed by: SURGERY

## 2025-07-29 PROCEDURE — 3700000001 HC GENERAL ANESTHESIA TIME - INITIAL BASE CHARGE: Performed by: SURGERY

## 2025-07-29 PROCEDURE — 2500000004 HC RX 250 GENERAL PHARMACY W/ HCPCS (ALT 636 FOR OP/ED): Performed by: SURGERY

## 2025-07-29 PROCEDURE — 2720000007 HC OR 272 NO HCPCS: Performed by: SURGERY

## 2025-07-29 PROCEDURE — 7100000010 HC PHASE TWO TIME - EACH INCREMENTAL 1 MINUTE: Performed by: SURGERY

## 2025-07-29 PROCEDURE — 2500000004 HC RX 250 GENERAL PHARMACY W/ HCPCS (ALT 636 FOR OP/ED)

## 2025-07-29 PROCEDURE — 38900 IO MAP OF SENT LYMPH NODE: CPT | Performed by: SURGERY

## 2025-07-29 PROCEDURE — 3600000004 HC OR TIME - INITIAL BASE CHARGE - PROCEDURE LEVEL FOUR: Performed by: SURGERY

## 2025-07-29 PROCEDURE — 88342 IMHCHEM/IMCYTCHM 1ST ANTB: CPT | Performed by: PATHOLOGY

## 2025-07-29 RX ORDER — APREPITANT 40 MG/1
40 CAPSULE ORAL ONCE
Status: COMPLETED | OUTPATIENT
Start: 2025-07-29 | End: 2025-07-29

## 2025-07-29 RX ORDER — SCOPOLAMINE 1 MG/3D
1 PATCH, EXTENDED RELEASE TRANSDERMAL
Status: DISCONTINUED | OUTPATIENT
Start: 2025-07-29 | End: 2025-07-29 | Stop reason: HOSPADM

## 2025-07-29 RX ORDER — MIDAZOLAM HYDROCHLORIDE 1 MG/ML
INJECTION, SOLUTION INTRAMUSCULAR; INTRAVENOUS AS NEEDED
Status: DISCONTINUED | OUTPATIENT
Start: 2025-07-29 | End: 2025-07-29

## 2025-07-29 RX ORDER — DEXAMETHASONE SODIUM PHOSPHATE 10 MG/ML
6 INJECTION INTRAMUSCULAR; INTRAVENOUS ONCE
Status: DISCONTINUED | OUTPATIENT
Start: 2025-07-29 | End: 2025-07-29 | Stop reason: HOSPADM

## 2025-07-29 RX ORDER — ACETAMINOPHEN 325 MG/1
650 TABLET ORAL EVERY 4 HOURS PRN
Status: DISCONTINUED | OUTPATIENT
Start: 2025-07-29 | End: 2025-07-29 | Stop reason: HOSPADM

## 2025-07-29 RX ORDER — LIDOCAINE HYDROCHLORIDE 10 MG/ML
INJECTION, SOLUTION INFILTRATION; PERINEURAL AS NEEDED
Status: DISCONTINUED | OUTPATIENT
Start: 2025-07-29 | End: 2025-07-29 | Stop reason: HOSPADM

## 2025-07-29 RX ORDER — LIDOCAINE HCL/PF 100 MG/5ML
SYRINGE (ML) INTRAVENOUS AS NEEDED
Status: DISCONTINUED | OUTPATIENT
Start: 2025-07-29 | End: 2025-07-29

## 2025-07-29 RX ORDER — SODIUM CHLORIDE, SODIUM LACTATE, POTASSIUM CHLORIDE, CALCIUM CHLORIDE 600; 310; 30; 20 MG/100ML; MG/100ML; MG/100ML; MG/100ML
INJECTION, SOLUTION INTRAVENOUS CONTINUOUS PRN
Status: DISCONTINUED | OUTPATIENT
Start: 2025-07-29 | End: 2025-07-29

## 2025-07-29 RX ORDER — HYDROMORPHONE HYDROCHLORIDE 1 MG/ML
INJECTION, SOLUTION INTRAMUSCULAR; INTRAVENOUS; SUBCUTANEOUS AS NEEDED
Status: DISCONTINUED | OUTPATIENT
Start: 2025-07-29 | End: 2025-07-29

## 2025-07-29 RX ORDER — ISOSULFAN BLUE 50 MG/5ML
INJECTION, SOLUTION SUBCUTANEOUS AS NEEDED
Status: DISCONTINUED | OUTPATIENT
Start: 2025-07-29 | End: 2025-07-29 | Stop reason: HOSPADM

## 2025-07-29 RX ORDER — ONDANSETRON HYDROCHLORIDE 2 MG/ML
4 INJECTION, SOLUTION INTRAVENOUS ONCE AS NEEDED
Status: DISCONTINUED | OUTPATIENT
Start: 2025-07-29 | End: 2025-07-29 | Stop reason: HOSPADM

## 2025-07-29 RX ORDER — CEFAZOLIN SODIUM 2 G/50ML
2 SOLUTION INTRAVENOUS ONCE
Status: COMPLETED | OUTPATIENT
Start: 2025-07-29 | End: 2025-07-29

## 2025-07-29 RX ORDER — ONDANSETRON HYDROCHLORIDE 2 MG/ML
INJECTION, SOLUTION INTRAVENOUS AS NEEDED
Status: DISCONTINUED | OUTPATIENT
Start: 2025-07-29 | End: 2025-07-29

## 2025-07-29 RX ORDER — GABAPENTIN 300 MG/1
300 CAPSULE ORAL 3 TIMES DAILY PRN
Qty: 15 CAPSULE | Refills: 0 | Status: SHIPPED | OUTPATIENT
Start: 2025-07-29 | End: 2026-07-29

## 2025-07-29 RX ORDER — OXYCODONE HYDROCHLORIDE 5 MG/1
5 TABLET ORAL EVERY 6 HOURS PRN
Qty: 5 TABLET | Refills: 0 | Status: SHIPPED | OUTPATIENT
Start: 2025-07-29 | End: 2025-08-05

## 2025-07-29 RX ORDER — PROPOFOL 10 MG/ML
INJECTION, EMULSION INTRAVENOUS AS NEEDED
Status: DISCONTINUED | OUTPATIENT
Start: 2025-07-29 | End: 2025-07-29

## 2025-07-29 RX ORDER — ALBUTEROL SULFATE 0.83 MG/ML
2.5 SOLUTION RESPIRATORY (INHALATION) ONCE AS NEEDED
Status: DISCONTINUED | OUTPATIENT
Start: 2025-07-29 | End: 2025-07-29 | Stop reason: HOSPADM

## 2025-07-29 RX ORDER — WATER 1 ML/ML
INJECTION IRRIGATION AS NEEDED
Status: DISCONTINUED | OUTPATIENT
Start: 2025-07-29 | End: 2025-07-29 | Stop reason: HOSPADM

## 2025-07-29 RX ORDER — KETOROLAC TROMETHAMINE 30 MG/ML
INJECTION, SOLUTION INTRAMUSCULAR; INTRAVENOUS AS NEEDED
Status: DISCONTINUED | OUTPATIENT
Start: 2025-07-29 | End: 2025-07-29

## 2025-07-29 RX ORDER — BUPIVACAINE HYDROCHLORIDE 5 MG/ML
INJECTION, SOLUTION PERINEURAL AS NEEDED
Status: DISCONTINUED | OUTPATIENT
Start: 2025-07-29 | End: 2025-07-29 | Stop reason: HOSPADM

## 2025-07-29 RX ORDER — FENTANYL CITRATE 50 UG/ML
INJECTION, SOLUTION INTRAMUSCULAR; INTRAVENOUS AS NEEDED
Status: DISCONTINUED | OUTPATIENT
Start: 2025-07-29 | End: 2025-07-29

## 2025-07-29 RX ADMIN — KETOROLAC TROMETHAMINE 30 MG: 30 INJECTION, SOLUTION INTRAMUSCULAR at 12:19

## 2025-07-29 RX ADMIN — DEXAMETHASONE SODIUM PHOSPHATE 4 MG: 4 INJECTION, SOLUTION INTRAMUSCULAR; INTRAVENOUS at 11:00

## 2025-07-29 RX ADMIN — MIDAZOLAM 2 MG: 1 INJECTION INTRAMUSCULAR; INTRAVENOUS at 10:44

## 2025-07-29 RX ADMIN — HYDROMORPHONE HYDROCHLORIDE 0.5 MG: 1 INJECTION, SOLUTION INTRAMUSCULAR; INTRAVENOUS; SUBCUTANEOUS at 12:06

## 2025-07-29 RX ADMIN — ONDANSETRON 4 MG: 2 INJECTION INTRAMUSCULAR; INTRAVENOUS at 12:19

## 2025-07-29 RX ADMIN — APREPITANT 40 MG: 40 CAPSULE ORAL at 08:34

## 2025-07-29 RX ADMIN — FENTANYL CITRATE 50 MCG: 50 INJECTION, SOLUTION INTRAMUSCULAR; INTRAVENOUS at 10:49

## 2025-07-29 RX ADMIN — PROPOFOL 200 MG: 10 INJECTION, EMULSION INTRAVENOUS at 10:49

## 2025-07-29 RX ADMIN — SCOPOLAMINE 1 PATCH: 1.5 PATCH, EXTENDED RELEASE TRANSDERMAL at 08:35

## 2025-07-29 RX ADMIN — LIDOCAINE HYDROCHLORIDE 60 MG: 20 INJECTION, SOLUTION INTRAVENOUS at 10:49

## 2025-07-29 RX ADMIN — Medication 0.9 MILLICURIE: at 07:30

## 2025-07-29 RX ADMIN — CEFAZOLIN SODIUM 2 G: 2 SOLUTION INTRAVENOUS at 10:53

## 2025-07-29 RX ADMIN — FENTANYL CITRATE 50 MCG: 50 INJECTION, SOLUTION INTRAMUSCULAR; INTRAVENOUS at 11:14

## 2025-07-29 RX ADMIN — SODIUM CHLORIDE, POTASSIUM CHLORIDE, SODIUM LACTATE AND CALCIUM CHLORIDE: 600; 310; 30; 20 INJECTION, SOLUTION INTRAVENOUS at 10:41

## 2025-07-29 SDOH — HEALTH STABILITY: MENTAL HEALTH: CURRENT SMOKER: 0

## 2025-07-29 ASSESSMENT — PAIN SCALES - GENERAL
PAINLEVEL_OUTOF10: 0 - NO PAIN

## 2025-07-29 ASSESSMENT — PAIN - FUNCTIONAL ASSESSMENT
PAIN_FUNCTIONAL_ASSESSMENT: 0-10

## 2025-07-29 NOTE — SIGNIFICANT EVENT
Discharge instructions reviewed and provided to patient and patients family. Patient denies questions related to material reviewed. Scopolamine patch information reviewed and information sheet provided to patient.  Surgical bra in place, surgical dressings c/d/. Patient denies pain and nausea.

## 2025-07-29 NOTE — ANESTHESIA POSTPROCEDURE EVALUATION
Patient: Nancy Marcus    Procedure Summary       Date: 07/29/25 Room / Location: [facility] 08 / [facility]    Anesthesia Start: 1041 Anesthesia Stop:     Procedures:       0730 SENTINEL INJECTION IN PRE-OP/ RIGHT BREAST MAGSEED LOCALIZATION LUMPECTOMY (Right)      RIGHT AXILLERY SENTINEL LYMPH NODE BIOPSY (Right) Diagnosis:       Infiltrating ductal carcinoma of right breast      (Infiltrating ductal carcinoma of right breast [C50.911])    Surgeons: Constantino Suarez MD Responsible Provider: Abel Chavez MD    Anesthesia Type: general ASA Status: 3            Anesthesia Type: general    Vitals Value Taken Time   /79 07/29/25 12:39   Temp 36.2 °C (97.2 °F) 07/29/25 12:38   Pulse 75 07/29/25 12:39   Resp 14 07/29/25 12:38   SpO2 94 % 07/29/25 12:39   Vitals shown include unfiled device data.    Anesthesia Post Evaluation    Patient location during evaluation: PACU  Patient participation: complete - patient participated  Level of consciousness: awake and alert  Pain management: adequate  Airway patency: patent  Cardiovascular status: acceptable  Respiratory status: acceptable  Hydration status: acceptable  Postoperative Nausea and Vomiting: none        There were no known notable events for this encounter.

## 2025-07-29 NOTE — ANESTHESIA PROCEDURE NOTES
Airway  Date/Time: 7/29/2025 10:50 AM  Reason: elective    Airway not difficult    Staffing  Performed: attending   Authorized by: Abel Chavez MD    Performed by: MANAV Butler  Patient location during procedure: OR    Patient Condition  Indications for airway management: anesthesia  Patient position: sniffing  MILS maintained throughout  Planned trial extubation  Sedation level: deep     Final Airway Details   Preoxygenated: yes  Final airway type: supraglottic airway  Successful airway: classic  Size: 4   Ventilation between attempts: none  Number of attempts at approach: 1  Number of other approaches attempted: 0

## 2025-07-29 NOTE — ANESTHESIA PREPROCEDURE EVALUATION
Patient: Nancy Marcus    Procedure Information       Date/Time: 07/29/25 1030    Procedures:       0730 SENTINEL INJECTION IN PRE-OP/ RIGHT BREAST MAGSEED LOCALIZATION LUMPECTOMY (Right) - sentinel lymph node injection, Dr Suarez to inject at 0600. Mag seed 7/15/25.      RIGHT AXILLERY SENTINEL LYMPH NODE BIOPSY/ POSSIBLE RIGHT AXILLARY SENTINEL LYMPH NODE DISSECTION (Right) - sentinel lymph node injection, Dr Suarez to inject at 0600, mag seed 7/15/25    Location: [facility] / [facility]    Surgeons: Constantino Suarez MD            Relevant Problems   Anesthesia   (+) PONV (postoperative nausea and vomiting)      Cardiac   (+) Benign essential hypertension      Neuro   (+) Other specified anxiety disorders      Endocrine   (+) Hypothyroidism   (+) Obesity      Musculoskeletal   (+) Generalized osteoarthritis of multiple sites   (+) Primary osteoarthritis of left foot   (+) Primary osteoarthritis of right foot      GYN   (+) Infiltrating ductal carcinoma of right breast       Clinical information reviewed:   Tobacco  Allergies  Meds  Problems  Med Hx  Surg Hx   Fam Hx          NPO Detail:  NPO/Void Status  Date of Last Liquid: 07/29/25  Time of Last Liquid: 0545  Date of Last Solid: 07/28/25  Time of Last Solid: 2000         Physical Exam    Airway  Mallampati: III  TM distance: >3 FB  Neck ROM: full  Mouth opening: 3 or more finger widths     Cardiovascular - normal exam   Dental - normal exam     Pulmonary - normal exam   Abdominal (+) obese             Anesthesia Plan    History of general anesthesia?: yes  History of complications of general anesthesia?: no    ASA 3     general     The patient is not a current smoker.    intravenous induction   Postoperative pain plan includes opioids.  Trial extubation is planned.  Anesthetic plan and risks discussed with patient.  Use of blood products discussed with patient who consented to blood products.    Plan discussed with CRNA.

## 2025-07-29 NOTE — OP NOTE
0730 SENTINEL INJECTION IN PRE-OP/ RIGHT BREAST MAGSEED LOCALIZATION LUMPECTOMY (R), RIGHT AXILLERY SENTINEL LYMPH NODE BIOPSY (R) Operative Note     Date: 2025  OR Location: [facility]    Name: Nancy Marcus, : 1960, Age: 65 y.o., MRN: [id number], Sex: female    Diagnosis  Pre-op Diagnosis      * Infiltrating ductal carcinoma of right breast [C50.911] Post-op Diagnosis     * Infiltrating ductal carcinoma of right breast [C50.911]     Procedures  0730 SENTINEL INJECTION IN PRE-OP/ RIGHT BREAST MAGSEED LOCALIZATION LUMPECTOMY  63248 - NC MASTECTOMY PARTIAL    RIGHT AXILLERY SENTINEL LYMPH NODE BIOPSY  15576 - NC BX/EXC LYMPH NODE OPEN DEEP AXILLARY NODE      Surgeons      * Constantino Suarez - Primary    Resident/Fellow/Other Assistant:  Surgeons and Role:  * No surgeons found with a matching role *    Staff:   Surgical Assistant: Pavithra  Circulator: Katt Villalobos Person: Layla Villalobos Person: Wilder Shepard Circulator: Precious    Anesthesia Staff: Anesthesiologist: Abel Chavez MD  CRNA: MILDRED Butler-CRNA    Procedure Summary  Anesthesia: General  ASA: III  Estimated Blood Loss: 0mL  Intra-op Medications: * Intraprocedure medication information is unavailable because the case start and end events have not been set *           Anesthesia Record               Intraprocedure I/O Totals          Intake    ceFAZolin (Ancef) 2 g in dextrose (iso) IV 50 mL 50.00 mL    Total Intake 50 mL          Specimen:   ID Type Source Tests Collected by Time   1 : RIGHT AXILLARY SENTINAL LYMPH NODE #1 Tissue AXILLARY LYMPH NODE DISSECTION RIGHT - BREAST SURGICAL PATHOLOGY EXAM Constantino Suarez MD 2025 1121   2 : Right Breast Magseed Lumpectomy-  Skin Anterior, Long Silk Stitch Lateral, Short Silk Stitch Superior Tissue BREAST LUMPECTOMY RIGHT SURGICAL PATHOLOGY EXAM Constantino Suarez MD 2025 1146   4 : RIGHT BREAST NEW POSTERIOR MARGIN- INK KELSYE NEW Tissue BREAST MARGIN RIGHT SURGICAL PATHOLOGY EXAM  Constantino Suarez MD 7/29/2025 1147   5 : RIGHT BREAST NEW SUPERIOR MARGIN- INK KELSEY NEW Tissue BREAST MARGIN RIGHT SURGICAL PATHOLOGY EXAM Constantino Suarez MD 7/29/2025 1147   6 : RIGHT BREAST NEW INFERIOR MARGIN- INK KELSEY NEW Tissue BREAST MARGIN RIGHT SURGICAL PATHOLOGY EXAM Constantino Suarez MD 7/29/2025 1147   7 : RIGHT BREAST NEW MEDIAL MARGIN- INK MARKS NEW Tissue BREAST MARGIN RIGHT SURGICAL PATHOLOGY EXAM Constantino Suarez MD 7/29/2025 1148   8 : RIGHT BREAST NEW LATERAL MARGIN- INK MARKS NEW Tissue BREAST MARGIN RIGHT SURGICAL PATHOLOGY EXAM Constantino Suarez MD 7/29/2025 1148                 Drains and/or Catheters: * None in log *    Tourniquet Times:         Implants:     Findings: Magseed and clip within the lumpectomy specimen.  1 right axillary sentinel lymph node.    Indications: Nancy Marcus is an 65 y.o. female who is having surgery for Infiltrating ductal carcinoma of right breast [C50.911].   65-year-old female with a clinical T1 N0 invasive ductal carcinoma of the right breast.    The patient was seen in the preoperative area. The risks, benefits, complications, treatment options, non-operative alternatives, expected recovery and outcomes were discussed with the patient. The possibilities of reaction to medication, pulmonary aspiration, injury to surrounding structures, bleeding, recurrent infection, the need for additional procedures, failure to diagnose a condition, and creating a complication requiring transfusion or operation were discussed with the patient. The patient concurred with the proposed plan, giving informed consent.  The site of surgery was properly noted/marked if necessary per policy. The patient has been actively warmed in preoperative area. Preoperative antibiotics have been ordered and given within 1 hours of incision. Venous thrombosis prophylaxis have been ordered including bilateral sequential compression devices    Procedure Details: Following informed consent the  patient was injected with 898 µCi of technetium 99 tilmanocept intradermally along the superior lateral edge of the areola preoperatively.  Patient was taken to the op room placed in supine position.  A huddle was performed.  She was given general anesthetic.  Sequential compression devices were in place and functioning.  She received Ancef IV in the operating room.  Her right breast, axilla and surrounding skin were prepped and draped in sterile fashion.  A timeout was performed.  The area of the sentinel lymph node was identified with neoprobe.  Following injection of local anesthetic a transverse skin incision was made at the inferior edge of the axilla and extended through the subtendinous tissue into the axillary fat pad.  A blue lymphatic was identified and followed to a blue lymph node which had increased activity by neoprobe.  The lymph node was excised and activity measured to be 500 by neoprobe.  Following this the highest level of activity within the axilla was 17.  No abnormal palpable lymph nodes were identified.  No additional blue lymph nodes were identified.  The wound was irrigated with saline.  Hemostasis appeared to be excellent.  Half percent plain Marcaine was infiltrated.  The wound was closed in layers with interrupted 3-0 Vicryl subcutaneous sutures and a running 4-0 Vicryl subcuticular skin stitch.  The Magseed was identified within the lateral portion of the breast and appeared to be close to the skin surface.  A transverse slightly oblique, elliptical skin incision was made overlying the region of the Magseed and excising the skin overlying the Magseed.  The incision was extended through the subcutaneous tissue and breast tissue surrounding the Magseed.  While in situ the specimen was marked a long silk suture lateral and short silk superior.  After removal of the lumpectomy was marked with margin marker with green ink anterior, blue inferior, orange lateral, yellow medial, black  posterior, red superior.  Specimen radiograph showed the clip and Magseed to be within the specimen.  Additional margins were taken superiorly, inferiorly, lateral, medial, posterior.  The new margins were marked with additional ink as above.  An additional anterior margin was not excised because of the overlying skin had already been removed.  The wound was irrigated with water.  Hemostasis was intact electrocautery and appeared to be excellent.  Half percent plain Marcaine was infiltrated.  Clips were placed to stephanie the periphery of the lumpectomy site.  The wound was closed in layers with interrupted 3-0 Vicryl subcutaneous sutures and running 4-0 Vicryl subcuticular skin stitch.  Dermabond was placed over the wounds.  Fluffs and a mastectomy bra were placed.  The patient was taken to the recovery room in satisfactory condition having tolerated the procedure well.    Evidence of Infection: No   Complications:  None; patient tolerated the procedure well.    Disposition: PACU - hemodynamically stable.  Condition: stable     Weldon Node Biopsy for Breast Cancer - Right  Operation performed with curative intent Yes   Tracer(s) used to identify sentinel nodes in the upfront surgery (non-neoadjuvant) setting Dye and Radioactive tracer   Tracer(s) used to identify sentinel nodes in the neoadjuvant setting N/A   All nodes (colored or non-colored) present at the end of a dye-filled lymphatic channel were removed Yes   All significantly radioactive nodes were removed Yes   All palpably suspicious nodes were removed Yes   Biopsy-proven positive nodes marked with clips prior to chemotherapy were identified and removed N/A         Task Performed by RNFA or Surgical Assistant:  Retraction and skin closure          Constantino Suarez  Phone Number: [phone]

## 2025-07-30 ENCOUNTER — TELEPHONE (OUTPATIENT)
Dept: SURGERY | Facility: CLINIC | Age: 65
End: 2025-07-30
Payer: MEDICARE

## 2025-07-30 NOTE — TELEPHONE ENCOUNTER
Pt had a right breast lumpectomy 7/29/25 and I called to see how pt was doing. I left message to call back with questions or concerns.

## 2025-07-31 ENCOUNTER — TELEPHONE (OUTPATIENT)
Dept: SURGERY | Facility: CLINIC | Age: 65
End: 2025-07-31
Payer: MEDICARE

## 2025-07-31 NOTE — TELEPHONE ENCOUNTER
I spoke with pt and her  Brendon. Please see pictures that were sent 7/30/25. Pt states she is icing the site and the swelling has gone down. Pts  is concerned about a small open area at the right axilla incision site. He placed steri strips there. Pt states she is in minimal pain and has been taking tylenol. The [employer] messages and pictures  have been sent to Dr Suarez. Will reach out to Dr Suarez to see if pt needs to have a follow up visit regarding this. Pt and  understands.

## 2025-07-31 NOTE — TELEPHONE ENCOUNTER
Spoke with pt and she had received a call from Dr Suarez regarding her swelling and open area at her incision. No appointment needed unless pt has further swelling or complaint. Pt understands.

## 2025-08-11 ENCOUNTER — TELEPHONE (OUTPATIENT)
Dept: SURGERY | Facility: CLINIC | Age: 65
End: 2025-08-11
Payer: MEDICARE

## 2025-08-15 ENCOUNTER — OFFICE VISIT (OUTPATIENT)
Dept: SURGERY | Facility: CLINIC | Age: 65
End: 2025-08-15
Payer: MEDICARE

## 2025-08-15 VITALS
DIASTOLIC BLOOD PRESSURE: 82 MMHG | HEIGHT: 62 IN | SYSTOLIC BLOOD PRESSURE: 148 MMHG | OXYGEN SATURATION: 97 % | WEIGHT: 187 LBS | RESPIRATION RATE: 18 BRPM | TEMPERATURE: 98.2 F | HEART RATE: 93 BPM | BODY MASS INDEX: 34.41 KG/M2

## 2025-08-15 DIAGNOSIS — C50.911 INFILTRATING DUCTAL CARCINOMA OF RIGHT BREAST: Primary | ICD-10-CM

## 2025-08-15 PROCEDURE — 3008F BODY MASS INDEX DOCD: CPT | Performed by: SURGERY

## 2025-08-15 PROCEDURE — 1036F TOBACCO NON-USER: CPT | Performed by: SURGERY

## 2025-08-15 PROCEDURE — 1125F AMNT PAIN NOTED PAIN PRSNT: CPT | Performed by: SURGERY

## 2025-08-15 PROCEDURE — 3079F DIAST BP 80-89 MM HG: CPT | Performed by: SURGERY

## 2025-08-15 PROCEDURE — 3077F SYST BP >= 140 MM HG: CPT | Performed by: SURGERY

## 2025-08-15 PROCEDURE — 99211 OFF/OP EST MAY X REQ PHY/QHP: CPT | Performed by: SURGERY

## 2025-08-15 PROCEDURE — 1159F MED LIST DOCD IN RCRD: CPT | Performed by: SURGERY

## 2025-08-15 ASSESSMENT — ENCOUNTER SYMPTOMS
OCCASIONAL FEELINGS OF UNSTEADINESS: 0
DEPRESSION: 0
LOSS OF SENSATION IN FEET: 0

## 2025-08-15 ASSESSMENT — PAIN SCALES - GENERAL: PAINLEVEL_OUTOF10: 2

## 2025-08-21 ENCOUNTER — TELEPHONE (OUTPATIENT)
Dept: SURGERY | Facility: CLINIC | Age: 65
End: 2025-08-21

## 2025-08-21 ENCOUNTER — TUMOR BOARD CONFERENCE (OUTPATIENT)
Dept: HEMATOLOGY/ONCOLOGY | Facility: HOSPITAL | Age: 65
End: 2025-08-21
Payer: MEDICARE

## 2025-08-26 ENCOUNTER — HOSPITAL ENCOUNTER (OUTPATIENT)
Dept: RADIATION ONCOLOGY | Facility: HOSPITAL | Age: 65
Setting detail: RADIATION/ONCOLOGY SERIES
Discharge: HOME | End: 2025-08-26
Payer: MEDICARE

## 2025-08-26 VITALS
DIASTOLIC BLOOD PRESSURE: 79 MMHG | SYSTOLIC BLOOD PRESSURE: 145 MMHG | TEMPERATURE: 97.9 F | HEART RATE: 81 BPM | WEIGHT: 195.6 LBS | OXYGEN SATURATION: 98 % | RESPIRATION RATE: 18 BRPM | BODY MASS INDEX: 35.78 KG/M2

## 2025-08-26 DIAGNOSIS — C50.911 INFILTRATING DUCTAL CARCINOMA OF RIGHT BREAST: Primary | ICD-10-CM

## 2025-08-26 PROCEDURE — 99215 OFFICE O/P EST HI 40 MIN: CPT | Performed by: RADIOLOGY

## 2025-08-26 PROCEDURE — G2211 COMPLEX E/M VISIT ADD ON: HCPCS | Performed by: RADIOLOGY

## 2025-08-26 PROCEDURE — 99205 OFFICE O/P NEW HI 60 MIN: CPT | Performed by: RADIOLOGY

## 2025-08-26 SDOH — HEALTH STABILITY: PHYSICAL HEALTH: ON AVERAGE, HOW MANY DAYS PER WEEK DO YOU ENGAGE IN MODERATE TO STRENUOUS EXERCISE (LIKE A BRISK WALK)?: 1 DAY

## 2025-08-26 SDOH — HEALTH STABILITY: PHYSICAL HEALTH: ON AVERAGE, HOW MANY MINUTES DO YOU ENGAGE IN EXERCISE AT THIS LEVEL?: 20 MIN

## 2025-08-26 SDOH — ECONOMIC STABILITY: INCOME INSECURITY: IN THE LAST 12 MONTHS, WAS THERE A TIME WHEN YOU WERE NOT ABLE TO PAY THE MORTGAGE OR RENT ON TIME?: NO

## 2025-08-26 SDOH — ECONOMIC STABILITY: TRANSPORTATION INSECURITY
IN THE PAST 12 MONTHS, HAS THE LACK OF TRANSPORTATION KEPT YOU FROM MEDICAL APPOINTMENTS OR FROM GETTING MEDICATIONS?: NO

## 2025-08-26 SDOH — ECONOMIC STABILITY: TRANSPORTATION INSECURITY
IN THE PAST 12 MONTHS, HAS LACK OF TRANSPORTATION KEPT YOU FROM MEETINGS, WORK, OR FROM GETTING THINGS NEEDED FOR DAILY LIVING?: NO

## 2025-08-26 ASSESSMENT — PATIENT HEALTH QUESTIONNAIRE - PHQ9
2. FEELING DOWN, DEPRESSED OR HOPELESS: NOT AT ALL
1. LITTLE INTEREST OR PLEASURE IN DOING THINGS: NOT AT ALL
SUM OF ALL RESPONSES TO PHQ9 QUESTIONS 1 AND 2: 0

## 2025-08-26 ASSESSMENT — ACTIVITIES OF DAILY LIVING (ADL)
BATHING: INDEPENDENT
FEEDING YOURSELF: INDEPENDENT
HEARING - RIGHT EAR: FUNCTIONAL
WALKS IN HOME: INDEPENDENT
DRESSING YOURSELF: INDEPENDENT
HEARING - LEFT EAR: FUNCTIONAL
GROOMING: INDEPENDENT
TOILETING: INDEPENDENT

## 2025-08-26 ASSESSMENT — ENCOUNTER SYMPTOMS
NEUROLOGICAL NEGATIVE: 1
EYES NEGATIVE: 1
ENDOCRINE NEGATIVE: 1
CONSTITUTIONAL NEGATIVE: 1
RESPIRATORY NEGATIVE: 1
HEMATOLOGIC/LYMPHATIC NEGATIVE: 1
GASTROINTESTINAL NEGATIVE: 1
CARDIOVASCULAR NEGATIVE: 1
OCCASIONAL FEELINGS OF UNSTEADINESS: 0
MUSCULOSKELETAL NEGATIVE: 1
DEPRESSION: 0
PSYCHIATRIC NEGATIVE: 1

## 2025-08-26 ASSESSMENT — SOCIAL DETERMINANTS OF HEALTH (SDOH)
WITHIN THE LAST YEAR, HAVE YOU BEEN AFRAID OF YOUR PARTNER OR EX-PARTNER?: NO
WITHIN THE LAST YEAR, HAVE YOU BEEN KICKED, HIT, SLAPPED, OR OTHERWISE PHYSICALLY HURT BY YOUR PARTNER OR EX-PARTNER?: NO
WITHIN THE LAST YEAR, HAVE YOU BEEN HUMILIATED OR EMOTIONALLY ABUSED IN OTHER WAYS BY YOUR PARTNER OR EX-PARTNER?: NO
HOW OFTEN DO YOU ATTEND CHURCH OR RELIGIOUS SERVICES?: MORE THAN 4 TIMES PER YEAR
DO YOU BELONG TO ANY CLUBS OR ORGANIZATIONS SUCH AS CHURCH GROUPS UNIONS, FRATERNAL OR ATHLETIC GROUPS, OR SCHOOL GROUPS?: YES
HOW OFTEN DO YOU ATTENT MEETINGS OF THE CLUB OR ORGANIZATION YOU BELONG TO?: MORE THAN 4 TIMES PER YEAR
IN THE PAST 12 MONTHS, HAS THE ELECTRIC, GAS, OIL, OR WATER COMPANY THREATENED TO SHUT OFF SERVICE IN YOUR HOME?: NO
HOW HARD IS IT FOR YOU TO PAY FOR THE VERY BASICS LIKE FOOD, HOUSING, MEDICAL CARE, AND HEATING?: NOT HARD AT ALL
WITHIN THE LAST YEAR, HAVE TO BEEN RAPED OR FORCED TO HAVE ANY KIND OF SEXUAL ACTIVITY BY YOUR PARTNER OR EX-PARTNER?: NO
IN A TYPICAL WEEK, HOW MANY TIMES DO YOU TALK ON THE PHONE WITH FAMILY, FRIENDS, OR NEIGHBORS?: MORE THAN THREE TIMES A WEEK
HOW OFTEN DO YOU GET TOGETHER WITH FRIENDS OR RELATIVES?: MORE THAN THREE TIMES A WEEK

## 2025-08-26 ASSESSMENT — COLUMBIA-SUICIDE SEVERITY RATING SCALE - C-SSRS
2. HAVE YOU ACTUALLY HAD ANY THOUGHTS OF KILLING YOURSELF?: NO
1. IN THE PAST MONTH, HAVE YOU WISHED YOU WERE DEAD OR WISHED YOU COULD GO TO SLEEP AND NOT WAKE UP?: NO
6. HAVE YOU EVER DONE ANYTHING, STARTED TO DO ANYTHING, OR PREPARED TO DO ANYTHING TO END YOUR LIFE?: NO

## 2025-08-29 ENCOUNTER — OFFICE VISIT (OUTPATIENT)
Dept: SURGERY | Facility: CLINIC | Age: 65
End: 2025-08-29
Payer: MEDICARE

## 2025-08-29 ENCOUNTER — PATIENT OUTREACH (OUTPATIENT)
Dept: HEMATOLOGY/ONCOLOGY | Facility: CLINIC | Age: 65
End: 2025-08-29

## 2025-08-29 VITALS
HEART RATE: 75 BPM | BODY MASS INDEX: 35.7 KG/M2 | DIASTOLIC BLOOD PRESSURE: 86 MMHG | OXYGEN SATURATION: 98 % | WEIGHT: 194 LBS | SYSTOLIC BLOOD PRESSURE: 148 MMHG | TEMPERATURE: 98.2 F | RESPIRATION RATE: 18 BRPM | HEIGHT: 62 IN

## 2025-08-29 DIAGNOSIS — C50.911 INFILTRATING DUCTAL CARCINOMA OF RIGHT BREAST: Primary | ICD-10-CM

## 2025-08-29 PROCEDURE — 3077F SYST BP >= 140 MM HG: CPT | Performed by: SURGERY

## 2025-08-29 PROCEDURE — 1159F MED LIST DOCD IN RCRD: CPT | Performed by: SURGERY

## 2025-08-29 PROCEDURE — 3008F BODY MASS INDEX DOCD: CPT | Performed by: SURGERY

## 2025-08-29 PROCEDURE — 1036F TOBACCO NON-USER: CPT | Performed by: SURGERY

## 2025-08-29 PROCEDURE — 99211 OFF/OP EST MAY X REQ PHY/QHP: CPT | Performed by: SURGERY

## 2025-08-29 PROCEDURE — 3079F DIAST BP 80-89 MM HG: CPT | Performed by: SURGERY

## 2025-08-29 PROCEDURE — 1126F AMNT PAIN NOTED NONE PRSNT: CPT | Performed by: SURGERY

## 2025-08-29 ASSESSMENT — ENCOUNTER SYMPTOMS
OCCASIONAL FEELINGS OF UNSTEADINESS: 0
DEPRESSION: 0
LOSS OF SENSATION IN FEET: 0

## 2025-08-29 ASSESSMENT — PAIN SCALES - GENERAL: PAINLEVEL_OUTOF10: 0-NO PAIN

## 2025-09-02 ENCOUNTER — OFFICE VISIT (OUTPATIENT)
Dept: HEMATOLOGY/ONCOLOGY | Facility: CLINIC | Age: 65
End: 2025-09-02
Payer: MEDICARE

## 2025-09-02 VITALS
OXYGEN SATURATION: 97 % | TEMPERATURE: 97.3 F | RESPIRATION RATE: 18 BRPM | HEIGHT: 61 IN | DIASTOLIC BLOOD PRESSURE: 80 MMHG | SYSTOLIC BLOOD PRESSURE: 147 MMHG | BODY MASS INDEX: 36.8 KG/M2 | HEART RATE: 77 BPM | WEIGHT: 194.89 LBS

## 2025-09-02 DIAGNOSIS — C50.911 INFILTRATING DUCTAL CARCINOMA OF RIGHT BREAST: ICD-10-CM

## 2025-09-02 PROCEDURE — 99205 OFFICE O/P NEW HI 60 MIN: CPT | Performed by: INTERNAL MEDICINE

## 2025-09-02 PROCEDURE — 1126F AMNT PAIN NOTED NONE PRSNT: CPT | Performed by: INTERNAL MEDICINE

## 2025-09-02 PROCEDURE — 1159F MED LIST DOCD IN RCRD: CPT | Performed by: INTERNAL MEDICINE

## 2025-09-02 PROCEDURE — 99215 OFFICE O/P EST HI 40 MIN: CPT | Performed by: INTERNAL MEDICINE

## 2025-09-02 PROCEDURE — 3079F DIAST BP 80-89 MM HG: CPT | Performed by: INTERNAL MEDICINE

## 2025-09-02 PROCEDURE — 3008F BODY MASS INDEX DOCD: CPT | Performed by: INTERNAL MEDICINE

## 2025-09-02 PROCEDURE — 3077F SYST BP >= 140 MM HG: CPT | Performed by: INTERNAL MEDICINE

## 2025-09-02 ASSESSMENT — COLUMBIA-SUICIDE SEVERITY RATING SCALE - C-SSRS
2. HAVE YOU ACTUALLY HAD ANY THOUGHTS OF KILLING YOURSELF?: NO
6. HAVE YOU EVER DONE ANYTHING, STARTED TO DO ANYTHING, OR PREPARED TO DO ANYTHING TO END YOUR LIFE?: NO
1. IN THE PAST MONTH, HAVE YOU WISHED YOU WERE DEAD OR WISHED YOU COULD GO TO SLEEP AND NOT WAKE UP?: NO

## 2025-09-02 ASSESSMENT — PAIN SCALES - GENERAL: PAINLEVEL_OUTOF10: 0-NO PAIN

## 2025-09-04 ENCOUNTER — APPOINTMENT (OUTPATIENT)
Dept: HEMATOLOGY/ONCOLOGY | Facility: CLINIC | Age: 65
End: 2025-09-04
Payer: MEDICARE

## 2026-03-24 ENCOUNTER — APPOINTMENT (OUTPATIENT)
Dept: PRIMARY CARE | Facility: CLINIC | Age: 66
End: 2026-03-24
Payer: MEDICARE

## (undated) DEVICE — BLADE, STRYKER, 4.0MM, RESECTOR, F-SERIES

## (undated) DEVICE — SUTURE, FIBERLINK P 2, 26IN BRAIDED POLYBLEND, BLUE

## (undated) DEVICE — DRESSING, GAUZE, SPONGE, 12 PLY, CURITY, 4 X 4 IN, RIGID TRAY, STERILE

## (undated) DEVICE — KIT, MARGINMARKER, 6 INK COLORS, STANDARD

## (undated) DEVICE — Device

## (undated) DEVICE — SUTURE, FIBERWIRE 2, 2 STRAND, 38 IN

## (undated) DEVICE — BURR, STRYKER, 5.5MM, BRL 6FLT

## (undated) DEVICE — PROTECTOR, HEEL/ANKLE/ELBOW, UNIVERSAL

## (undated) DEVICE — NEEDLE, ECLIPSE, 25GA X 1-1/2 IN

## (undated) DEVICE — SYRINGE, 5 CC, LUER LOCK

## (undated) DEVICE — CONNECTOR, 5 IN 1, STERILE

## (undated) DEVICE — CLIP, LIGATING, LIGACLIP EXTRA, MEDIUM, TITANIUM, WHITE

## (undated) DEVICE — SLEEVE, ARM, LAERAL TRACTION

## (undated) DEVICE — PROBE COVER, INTRAOPERATIVE, 13 X 244CM (5 X 96IN)

## (undated) DEVICE — PROBE, APOLLO RF, 90 DEG, EXTRA LARTGE

## (undated) DEVICE — DRESSING, ABDOMINAL, TENDERSORB, 8 X 7-1/2 IN, STERILE

## (undated) DEVICE — CANNULA, INSTRUMENT, 7MM ID X 7CM

## (undated) DEVICE — COVER, TRANSDUCER, NEO GUARD, 2.6 X 30CM, LF

## (undated) DEVICE — DRESSING, GAUZE, SUPER FLUFF, 7.75 X 8.75 IN, STERILE

## (undated) DEVICE — NEEDLE, SCORPION MULTIFIRE

## (undated) DEVICE — TUBING, PATIENT 8FT STERILE

## (undated) DEVICE — SLEEVE, VASO PRESS, CALF GARMENT, MEDIUM, GREEN

## (undated) DEVICE — DRAPE, SHEET, U, W/ADHESIVE STRIP, IMPERVIOUS, 60 X 70 IN, DISPOSABLE, LF, STERILE

## (undated) DEVICE — DRESSING, TELFA, 3X4

## (undated) DEVICE — BLADE, STRYKER, 5.5MM RESECTOR, F-SERIES

## (undated) DEVICE — DRESSING, GAUZE, PETROLATUM, PATCH, XEROFORM, 1 X 8 IN, STERILE

## (undated) DEVICE — DRAPE, SHEET, THREE QUARTER, FAN FOLD, 57 X 77 IN

## (undated) DEVICE — STRIP, SKIN CLOSURE, STERI STRIP, REINFORCED, 0.5 X 4 IN

## (undated) DEVICE — CANNULA, BUTTON, PASSPORT, 8MM X 5CM

## (undated) DEVICE — APPLICATOR, CHLORAPREP, W/ORANGE TINT, 26ML